# Patient Record
Sex: FEMALE | URBAN - METROPOLITAN AREA
[De-identification: names, ages, dates, MRNs, and addresses within clinical notes are randomized per-mention and may not be internally consistent; named-entity substitution may affect disease eponyms.]

---

## 2017-03-05 ENCOUNTER — EMERGENCY (EMERGENCY)
Facility: HOSPITAL | Age: 62
LOS: 0 days | Discharge: HOME | End: 2017-03-05
Attending: EMERGENCY MEDICINE | Admitting: EMERGENCY MEDICINE
Payer: COMMERCIAL

## 2017-03-05 VITALS
TEMPERATURE: 98 F | OXYGEN SATURATION: 99 % | SYSTOLIC BLOOD PRESSURE: 131 MMHG | RESPIRATION RATE: 16 BRPM | DIASTOLIC BLOOD PRESSURE: 78 MMHG | HEART RATE: 74 BPM

## 2017-03-05 VITALS — HEIGHT: 62 IN | WEIGHT: 199.96 LBS

## 2017-03-05 DIAGNOSIS — S63.502A UNSPECIFIED SPRAIN OF LEFT WRIST, INITIAL ENCOUNTER: ICD-10-CM
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
## 2017-03-05 DIAGNOSIS — M25.532 PAIN IN LEFT WRIST: ICD-10-CM

## 2017-03-05 DIAGNOSIS — E03.9 HYPOTHYROIDISM, UNSPECIFIED: ICD-10-CM
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
## 2017-03-05 DIAGNOSIS — I10 ESSENTIAL (PRIMARY) HYPERTENSION: ICD-10-CM

## 2017-03-05 PROCEDURE — 73090 X-RAY EXAM OF FOREARM: CPT | Mod: 26,LT

## 2017-03-05 PROCEDURE — 73130 X-RAY EXAM OF HAND: CPT | Mod: 26,LT

## 2017-03-05 PROCEDURE — 99283 EMERGENCY DEPT VISIT LOW MDM: CPT

## 2017-03-05 PROCEDURE — 73110 X-RAY EXAM OF WRIST: CPT | Mod: 26,LT

## 2017-03-05 RX ORDER — LEVOTHYROXINE SODIUM 125 MCG
1 TABLET ORAL
Qty: 0 | Refills: 0 | COMMUNITY

## 2017-03-05 RX ORDER — IBUPROFEN 200 MG
600 TABLET ORAL ONCE
Qty: 0 | Refills: 0 | Status: COMPLETED | OUTPATIENT
Start: 2017-03-05 | End: 2017-03-05

## 2017-03-05 RX ADMIN — Medication 600 MILLIGRAM(S): at 11:04

## 2017-03-05 NOTE — ED STATDOCS - DETAILS:
I, Sahra Funk, performed the initial face to face bedside interview with this patient regarding history of present illness, review of symptoms and relevant past medical, social and family history.  I completed an independent physical examination.  I was the initial provider who evaluated this patient. I have signed out the follow up of any pending tests (i.e. labs, radiological studies) to the ACP.  I have communicated the patient’s plan of care and disposition with the ACP.  The history, relevant review of systems, past medical and surgical history, medical decision making, and physical examination was documented by the scribe in my presence and I attest to the accuracy of the documentation.

## 2017-03-05 NOTE — ED PROCEDURE NOTE - NS ED PERI VASCULAR NEG
fingers/toes warm to touch/no cyanosis of extremity/no swelling/no paresthesia/capillary refill time < 2 seconds

## 2017-03-05 NOTE — ED PROCEDURE NOTE - CPROC ED POST PROC CARE GUIDE1
Verbal/written post procedure instructions were given to patient/caregiver./Elevate the injured extremity as instructed./Instructed patient/caregiver to follow-up with primary care physician.

## 2017-03-05 NOTE — ED STATDOCS - OBJECTIVE STATEMENT
60 y/o female pmhx htn, thyroid presents to ED due to fall. Pt states she fell off her stoop, no LOC. C/o of left wrist pain denies numbness, tingling. 60 y/o female pmhx htn, thyroid, no blood thinners, no smoke, allergic to penicillin presents to ED due to fall. Pt states she fell off her stoop, no LOC. C/o of left wrist pain denies numbness, tingling. 62 y/o female pmhx htn, thyroid, no blood thinners, no smoke, allergic to penicillin presents to ED due to fall. Pt states she fell off her stoop yesterday, no LOC. C/o of left wrist pain denies numbness, tingling.

## 2017-03-05 NOTE — ED STATDOCS - MUSCULOSKELETAL FINDINGS, MLM
painful ROM, no point tenderness in left wrist. Medeal, radial and ulna nerves in tact. good radial pulse . No tenderness of forearm. painful ROM, no point tenderness in left wrist. Median, radial and ulnar nerves in tact. good radial pulse . No tenderness of forearm., elbow, shoulder

## 2017-03-05 NOTE — ED ADULT NURSE NOTE - OBJECTIVE STATEMENT
Yesturday fell at 2.30 fell on concrete (-0 loc left wrist inkury (-)m deformity (+) swelling (+) pain left knwee abrasion  healing

## 2017-03-05 NOTE — ED STATDOCS - PROGRESS NOTE DETAILS
Patient seen and evaluated, no point tenderness to L wrist, no snuffbox tenderness, decreased ROM with flexion and extension secondary to pain, 2+ radial pulse, hand with FROM and strength, cap refill less than 2 seconds.  Xray with no fx, foam wrist splint applied, ortho f/u given -Arturo Lackey PA-C

## 2018-03-01 ENCOUNTER — TRANSCRIPTION ENCOUNTER (OUTPATIENT)
Age: 63
End: 2018-03-01

## 2019-06-28 PROBLEM — I10 ESSENTIAL (PRIMARY) HYPERTENSION: Chronic | Status: ACTIVE | Noted: 2017-03-05

## 2019-06-28 PROBLEM — E03.9 HYPOTHYROIDISM, UNSPECIFIED: Chronic | Status: ACTIVE | Noted: 2017-03-05

## 2019-07-25 ENCOUNTER — APPOINTMENT (OUTPATIENT)
Dept: GASTROENTEROLOGY | Facility: CLINIC | Age: 64
End: 2019-07-25
Payer: COMMERCIAL

## 2019-07-25 VITALS
HEIGHT: 62 IN | HEART RATE: 75 BPM | DIASTOLIC BLOOD PRESSURE: 92 MMHG | SYSTOLIC BLOOD PRESSURE: 169 MMHG | WEIGHT: 218 LBS | BODY MASS INDEX: 40.12 KG/M2

## 2019-07-25 PROCEDURE — 99205 OFFICE O/P NEW HI 60 MIN: CPT

## 2019-07-25 NOTE — PHYSICAL EXAM
[General Appearance - Alert] : alert [General Appearance - In No Acute Distress] : in no acute distress [Sclera] : the sclera and conjunctiva were normal [PERRL With Normal Accommodation] : pupils were equal in size, round, and reactive to light [Extraocular Movements] : extraocular movements were intact [Outer Ear] : the ears and nose were normal in appearance [Oropharynx] : the oropharynx was normal [Neck Appearance] : the appearance of the neck was normal [Neck Cervical Mass (___cm)] : no neck mass was observed [Jugular Venous Distention Increased] : there was no jugular-venous distention [Thyroid Diffuse Enlargement] : the thyroid was not enlarged [Thyroid Nodule] : there were no palpable thyroid nodules [Auscultation Breath Sounds / Voice Sounds] : lungs were clear to auscultation bilaterally [Heart Rate And Rhythm] : heart rate was normal and rhythm regular [Heart Sounds] : normal S1 and S2 [Heart Sounds Gallop] : no gallops [Murmurs] : no murmurs [Heart Sounds Pericardial Friction Rub] : no pericardial rub [Bowel Sounds] : normal bowel sounds [Abdomen Soft] : soft [Abdomen Tenderness] : non-tender [Abdomen Mass (___ Cm)] : no abdominal mass palpated [Abnormal Walk] : normal gait [Nail Clubbing] : no clubbing  or cyanosis of the fingernails [Musculoskeletal - Swelling] : no joint swelling seen [Motor Tone] : muscle strength and tone were normal [Skin Color & Pigmentation] : normal skin color and pigmentation [Skin Turgor] : normal skin turgor [] : no rash [Deep Tendon Reflexes (DTR)] : deep tendon reflexes were 2+ and symmetric [Sensation] : the sensory exam was normal to light touch and pinprick [No Focal Deficits] : no focal deficits [Oriented To Time, Place, And Person] : oriented to person, place, and time [Impaired Insight] : insight and judgment were intact [Affect] : the affect was normal

## 2019-07-25 NOTE — HISTORY OF PRESENT ILLNESS
[FreeTextEntry1] : Patient had a physical exam and was found to have occult positive stools she uses Aleve and Advil from time to time. She denies heartburn or dysphagia he recently had chest pain and was evaluated for cardiac etiology which was reportedly unrevealing other than finding any murmur. The character of that murmur she does not recall. A sonogram was also performed and reportedly a gallstone was demonstrated she has not had any subsequent bouts of pain and no jaundice no nausea and no pain radiating to her back. She has had a colonoscopy many years ago with a history of colon polyps

## 2019-07-25 NOTE — ASSESSMENT
[FreeTextEntry1] : #1 Hemoccult-positive stools will schedule a colonoscopy and upper endoscopy she has had a history of use of nonsteroidal anti-inflammatory drugs and will ask her to withhold using it at this time and she's had a history of colon polyps which requires followup at this time anyway. #2 chest pain whether this is a noncardiac chest pain etiology or in some way related to her murmur is unclear gallstones being noted on a sonogram also suggested biliary etiology although it is not certain therefore we'll use a low-fat which will be given towards weight loss as well

## 2019-07-25 NOTE — REASON FOR VISIT
[Consultation] : a consultation visit [FreeTextEntry1] : Occult positive stools noncardiac chest pain obesity

## 2019-07-29 ENCOUNTER — RESULT REVIEW (OUTPATIENT)
Age: 64
End: 2019-07-29

## 2019-07-29 ENCOUNTER — APPOINTMENT (OUTPATIENT)
Dept: GASTROENTEROLOGY | Facility: AMBULATORY MEDICAL SERVICES | Age: 64
End: 2019-07-29
Payer: COMMERCIAL

## 2019-07-29 PROCEDURE — 43239 EGD BIOPSY SINGLE/MULTIPLE: CPT

## 2019-07-29 PROCEDURE — 45378 DIAGNOSTIC COLONOSCOPY: CPT

## 2019-11-14 ENCOUNTER — APPOINTMENT (OUTPATIENT)
Dept: GASTROENTEROLOGY | Facility: CLINIC | Age: 64
End: 2019-11-14
Payer: COMMERCIAL

## 2019-11-14 VITALS
HEIGHT: 62 IN | DIASTOLIC BLOOD PRESSURE: 84 MMHG | HEART RATE: 66 BPM | BODY MASS INDEX: 39.75 KG/M2 | WEIGHT: 216 LBS | SYSTOLIC BLOOD PRESSURE: 143 MMHG

## 2019-11-14 DIAGNOSIS — R19.5 OTHER FECAL ABNORMALITIES: ICD-10-CM

## 2019-11-14 PROCEDURE — 99214 OFFICE O/P EST MOD 30 MIN: CPT

## 2019-11-14 NOTE — PHYSICAL EXAM
[General Appearance - Alert] : alert [General Appearance - In No Acute Distress] : in no acute distress [PERRL With Normal Accommodation] : pupils were equal in size, round, and reactive to light [Sclera] : the sclera and conjunctiva were normal [Extraocular Movements] : extraocular movements were intact [Outer Ear] : the ears and nose were normal in appearance [Oropharynx] : the oropharynx was normal [Neck Appearance] : the appearance of the neck was normal [Neck Cervical Mass (___cm)] : no neck mass was observed [Jugular Venous Distention Increased] : there was no jugular-venous distention [Thyroid Diffuse Enlargement] : the thyroid was not enlarged [Thyroid Nodule] : there were no palpable thyroid nodules [Auscultation Breath Sounds / Voice Sounds] : lungs were clear to auscultation bilaterally [Heart Rate And Rhythm] : heart rate was normal and rhythm regular [Heart Sounds] : normal S1 and S2 [Heart Sounds Gallop] : no gallops [Murmurs] : no murmurs [Heart Sounds Pericardial Friction Rub] : no pericardial rub [Bowel Sounds] : normal bowel sounds [Abdomen Soft] : soft [Abdomen Tenderness] : non-tender [Abdomen Mass (___ Cm)] : no abdominal mass palpated [Abnormal Walk] : normal gait [Musculoskeletal - Swelling] : no joint swelling seen [Nail Clubbing] : no clubbing  or cyanosis of the fingernails [Motor Tone] : muscle strength and tone were normal [Skin Turgor] : normal skin turgor [Skin Color & Pigmentation] : normal skin color and pigmentation [] : no rash [Sensation] : the sensory exam was normal to light touch and pinprick [Deep Tendon Reflexes (DTR)] : deep tendon reflexes were 2+ and symmetric [No Focal Deficits] : no focal deficits [Oriented To Time, Place, And Person] : oriented to person, place, and time [Impaired Insight] : insight and judgment were intact [Affect] : the affect was normal

## 2019-11-14 NOTE — HISTORY OF PRESENT ILLNESS
[FreeTextEntry1] : Should hear today for followup after her upper endoscopy suggested reflux and colonoscopy was reportedly unrevealing a Schatzki ring was seen at the endoscopy but this is asymptomatic she has had chest pains in the past I would appear to be water brash repeating close to bedtime she has been on PPI therapy b.i.d. with marked improvement of her symptoms no chest pain no shortness of breath no heartburn no dysphagia has been no pouch of the topical pain despite reports elsewhere of gallstones she denies any jaundice and she has not had any weight loss despite being recommended to do so

## 2019-11-14 NOTE — ASSESSMENT
[FreeTextEntry1] : #1 gastroesophageal reflux disease with water cody would suggest continuing PPI therapy b.i.d. and advise on a GERD diet strictly and work on weight loss lately bloody decreasing fats in the diet which will help normalize and a biliary colic as well we'll give the patient name of a surgeon if pain clearly recurs and she was advised to go to the emergency room if this were to occur due to the fact that things are not clearly related to her gallstone we will continue to be monitoring for symptoms

## 2020-01-14 ENCOUNTER — LABORATORY RESULT (OUTPATIENT)
Age: 65
End: 2020-01-14

## 2020-01-14 ENCOUNTER — APPOINTMENT (OUTPATIENT)
Dept: GASTROENTEROLOGY | Facility: CLINIC | Age: 65
End: 2020-01-14
Payer: COMMERCIAL

## 2020-01-14 VITALS
BODY MASS INDEX: 40.12 KG/M2 | HEIGHT: 62 IN | WEIGHT: 218 LBS | HEART RATE: 81 BPM | DIASTOLIC BLOOD PRESSURE: 84 MMHG | SYSTOLIC BLOOD PRESSURE: 153 MMHG

## 2020-01-14 PROCEDURE — 99214 OFFICE O/P EST MOD 30 MIN: CPT

## 2020-01-14 NOTE — HISTORY OF PRESENT ILLNESS
[FreeTextEntry1] : Patient recently has a repeat episode of sharp pain radiating to her left shoulder. She recently saw a surgeon who did not feel it was clearly biliary in nature. This pain woke her from her sleep she has had upper endoscopy which showed a large hiatal hernia and advised to take Prilosec twice a day but she was only taking it one time a day her sonogram done suggested gallstones but the report is not with her today. She denies jaundice or fevers she denies nausea or shortness of breath

## 2020-01-14 NOTE — PHYSICAL EXAM
[General Appearance - Alert] : alert [Sclera] : the sclera and conjunctiva were normal [General Appearance - In No Acute Distress] : in no acute distress [Extraocular Movements] : extraocular movements were intact [PERRL With Normal Accommodation] : pupils were equal in size, round, and reactive to light [Oropharynx] : the oropharynx was normal [Neck Appearance] : the appearance of the neck was normal [Outer Ear] : the ears and nose were normal in appearance [Jugular Venous Distention Increased] : there was no jugular-venous distention [Thyroid Nodule] : there were no palpable thyroid nodules [Neck Cervical Mass (___cm)] : no neck mass was observed [Thyroid Diffuse Enlargement] : the thyroid was not enlarged [Auscultation Breath Sounds / Voice Sounds] : lungs were clear to auscultation bilaterally [Heart Rate And Rhythm] : heart rate was normal and rhythm regular [Heart Sounds Gallop] : no gallops [Heart Sounds] : normal S1 and S2 [Heart Sounds Pericardial Friction Rub] : no pericardial rub [Murmurs] : no murmurs [Abdomen Tenderness] : non-tender [Abdomen Soft] : soft [Bowel Sounds] : normal bowel sounds [Abnormal Walk] : normal gait [Abdomen Mass (___ Cm)] : no abdominal mass palpated [Nail Clubbing] : no clubbing  or cyanosis of the fingernails [Musculoskeletal - Swelling] : no joint swelling seen [Motor Tone] : muscle strength and tone were normal [Skin Color & Pigmentation] : normal skin color and pigmentation [Skin Turgor] : normal skin turgor [Deep Tendon Reflexes (DTR)] : deep tendon reflexes were 2+ and symmetric [] : no rash [No Focal Deficits] : no focal deficits [Sensation] : the sensory exam was normal to light touch and pinprick [Oriented To Time, Place, And Person] : oriented to person, place, and time [Impaired Insight] : insight and judgment were intact [Affect] : the affect was normal

## 2020-01-14 NOTE — ASSESSMENT
[FreeTextEntry1] : Pain radiating to her left shoulder likely biliary but need to rule out esophageal spasm we'll perform CAT scan of the abdomen and get blood work views Prilosec b.i.d. and add NuLev

## 2020-01-21 ENCOUNTER — APPOINTMENT (OUTPATIENT)
Dept: CT IMAGING | Facility: CLINIC | Age: 65
End: 2020-01-21
Payer: COMMERCIAL

## 2020-01-21 ENCOUNTER — OUTPATIENT (OUTPATIENT)
Dept: OUTPATIENT SERVICES | Facility: HOSPITAL | Age: 65
LOS: 1 days | End: 2020-01-21
Payer: COMMERCIAL

## 2020-01-21 DIAGNOSIS — Z00.8 ENCOUNTER FOR OTHER GENERAL EXAMINATION: ICD-10-CM

## 2020-01-21 DIAGNOSIS — K21.9 GASTRO-ESOPHAGEAL REFLUX DISEASE WITHOUT ESOPHAGITIS: ICD-10-CM

## 2020-01-21 DIAGNOSIS — K80.20 CALCULUS OF GALLBLADDER WITHOUT CHOLECYSTITIS WITHOUT OBSTRUCTION: ICD-10-CM

## 2020-01-21 PROCEDURE — 74177 CT ABD & PELVIS W/CONTRAST: CPT

## 2020-01-21 PROCEDURE — 74177 CT ABD & PELVIS W/CONTRAST: CPT | Mod: 26

## 2020-01-21 PROCEDURE — 82565 ASSAY OF CREATININE: CPT

## 2020-02-25 ENCOUNTER — APPOINTMENT (OUTPATIENT)
Dept: GASTROENTEROLOGY | Facility: CLINIC | Age: 65
End: 2020-02-25
Payer: COMMERCIAL

## 2020-02-25 VITALS
HEIGHT: 62 IN | SYSTOLIC BLOOD PRESSURE: 124 MMHG | DIASTOLIC BLOOD PRESSURE: 79 MMHG | WEIGHT: 215 LBS | BODY MASS INDEX: 39.56 KG/M2 | HEART RATE: 73 BPM

## 2020-02-25 DIAGNOSIS — K21.9 GASTRO-ESOPHAGEAL REFLUX DISEASE W/OUT ESOPHAGITIS: ICD-10-CM

## 2020-02-25 PROCEDURE — 99214 OFFICE O/P EST MOD 30 MIN: CPT

## 2020-02-25 NOTE — HISTORY OF PRESENT ILLNESS
[FreeTextEntry1] : Patient feeling more comfortable on anti- reflux regimen she had one episode of chest pain near her left shoulder. She denies dysphagia, and a CAT scan demonstrated gallstones without cholecystitis. She denies jaundice and she has been having a great deal of difficulty with weight loss

## 2020-02-25 NOTE — PHYSICAL EXAM
[General Appearance - Alert] : alert [General Appearance - In No Acute Distress] : in no acute distress [Sclera] : the sclera and conjunctiva were normal [Extraocular Movements] : extraocular movements were intact [PERRL With Normal Accommodation] : pupils were equal in size, round, and reactive to light [Outer Ear] : the ears and nose were normal in appearance [Oropharynx] : the oropharynx was normal [Neck Appearance] : the appearance of the neck was normal [Neck Cervical Mass (___cm)] : no neck mass was observed [Jugular Venous Distention Increased] : there was no jugular-venous distention [Thyroid Diffuse Enlargement] : the thyroid was not enlarged [Thyroid Nodule] : there were no palpable thyroid nodules [Auscultation Breath Sounds / Voice Sounds] : lungs were clear to auscultation bilaterally [Heart Rate And Rhythm] : heart rate was normal and rhythm regular [Heart Sounds] : normal S1 and S2 [Heart Sounds Gallop] : no gallops [Murmurs] : no murmurs [Heart Sounds Pericardial Friction Rub] : no pericardial rub [Bowel Sounds] : normal bowel sounds [Abdomen Soft] : soft [Abdomen Tenderness] : non-tender [Abdomen Mass (___ Cm)] : no abdominal mass palpated [Abnormal Walk] : normal gait [Nail Clubbing] : no clubbing  or cyanosis of the fingernails [Musculoskeletal - Swelling] : no joint swelling seen [Motor Tone] : muscle strength and tone were normal [Skin Color & Pigmentation] : normal skin color and pigmentation [Skin Turgor] : normal skin turgor [] : no rash [Sensation] : the sensory exam was normal to light touch and pinprick [Deep Tendon Reflexes (DTR)] : deep tendon reflexes were 2+ and symmetric [No Focal Deficits] : no focal deficits [Oriented To Time, Place, And Person] : oriented to person, place, and time [Impaired Insight] : insight and judgment were intact [Affect] : the affect was normal

## 2020-04-07 ENCOUNTER — APPOINTMENT (OUTPATIENT)
Dept: GASTROENTEROLOGY | Facility: CLINIC | Age: 65
End: 2020-04-07

## 2020-11-18 ENCOUNTER — APPOINTMENT (OUTPATIENT)
Dept: GASTROENTEROLOGY | Facility: CLINIC | Age: 65
End: 2020-11-18
Payer: COMMERCIAL

## 2020-11-18 VITALS
SYSTOLIC BLOOD PRESSURE: 153 MMHG | BODY MASS INDEX: 40.12 KG/M2 | WEIGHT: 218 LBS | DIASTOLIC BLOOD PRESSURE: 79 MMHG | HEART RATE: 66 BPM | TEMPERATURE: 98.3 F | HEIGHT: 62 IN

## 2020-11-18 PROCEDURE — 99213 OFFICE O/P EST LOW 20 MIN: CPT

## 2020-11-18 RX ORDER — HYOSCYAMINE SULFATE 0.38 MG/1
0.38 TABLET, EXTENDED RELEASE ORAL
Qty: 60 | Refills: 3 | Status: DISCONTINUED | COMMUNITY
Start: 2020-11-18 | End: 2020-11-18

## 2020-11-18 NOTE — ASSESSMENT
[FreeTextEntry1] : 66 yo female with left upper chest wall spasm. \par She had this before in past and it was thought to possibly biliary per Dr. Murray? \par Did see Dr. Kohler but did not have a ccy. \par I believe this could be stress induced spasm?\par Will tried antispasmodics, pt to f/u with cardiology. \par Call back if pain gets worse. \par F/u 2 weeks, discussed with Dr. Rey.

## 2020-11-18 NOTE — HISTORY OF PRESENT ILLNESS
[de-identified] : 64 yo female with hx of left upper chest wall spasms.  Pt reports she had another occurrence that occurs intermittently.  She has had it worked up by Dr. Murray and Dr. Jean (cardiology) and was unremarkable. States this occurrence happened after eating pork chops.  Now pain is improved.  Does report increased stress and denies ever trying an antispasmodic in past.  No SOB.  Denies abdominal pain, n/v.

## 2020-12-09 ENCOUNTER — APPOINTMENT (OUTPATIENT)
Dept: GASTROENTEROLOGY | Facility: CLINIC | Age: 65
End: 2020-12-09

## 2021-05-27 ENCOUNTER — APPOINTMENT (OUTPATIENT)
Dept: GASTROENTEROLOGY | Facility: CLINIC | Age: 66
End: 2021-05-27
Payer: COMMERCIAL

## 2021-05-27 VITALS
WEIGHT: 221 LBS | HEART RATE: 67 BPM | BODY MASS INDEX: 40.67 KG/M2 | DIASTOLIC BLOOD PRESSURE: 83 MMHG | HEIGHT: 62 IN | SYSTOLIC BLOOD PRESSURE: 147 MMHG

## 2021-05-27 DIAGNOSIS — K80.20 CALCULUS OF GALLBLADDER W/OUT CHOLECYSTITIS W/OUT OBSTRUCTION: ICD-10-CM

## 2021-05-27 DIAGNOSIS — K22.4 DYSKINESIA OF ESOPHAGUS: ICD-10-CM

## 2021-05-27 DIAGNOSIS — E66.9 OBESITY, UNSPECIFIED: ICD-10-CM

## 2021-05-27 PROCEDURE — 99072 ADDL SUPL MATRL&STAF TM PHE: CPT

## 2021-05-27 PROCEDURE — 99214 OFFICE O/P EST MOD 30 MIN: CPT

## 2021-05-27 NOTE — HISTORY OF PRESENT ILLNESS
[FreeTextEntry1] : Patient was here last year, for esophageal chest pain, and was found to have gallstones, as well as a large hiatal hernia. She was put on PPI therapy and the esophageal chest pain disappeared. She was given a course of Qsymia for obesity but never used. It

## 2021-05-27 NOTE — ASSESSMENT
[FreeTextEntry1] : #1, obesity, we'll restart Qsymia\par #2 gallstones we'll continue to monitor and advise on a low-fat diet\par #3 large hiatal hernia with erosive esophagitis. Repeat endoscopy at this time

## 2021-05-27 NOTE — PHYSICAL EXAM
[General Appearance - Alert] : alert [General Appearance - In No Acute Distress] : in no acute distress [Sclera] : the sclera and conjunctiva were normal [PERRL With Normal Accommodation] : pupils were equal in size, round, and reactive to light [Extraocular Movements] : extraocular movements were intact [Outer Ear] : the ears and nose were normal in appearance [Oropharynx] : the oropharynx was normal [Neck Appearance] : the appearance of the neck was normal [Neck Cervical Mass (___cm)] : no neck mass was observed [Jugular Venous Distention Increased] : there was no jugular-venous distention [Thyroid Diffuse Enlargement] : the thyroid was not enlarged [Thyroid Nodule] : there were no palpable thyroid nodules [Auscultation Breath Sounds / Voice Sounds] : lungs were clear to auscultation bilaterally [Heart Rate And Rhythm] : heart rate was normal and rhythm regular [Heart Sounds] : normal S1 and S2 [Heart Sounds Gallop] : no gallops [Murmurs] : no murmurs [Heart Sounds Pericardial Friction Rub] : no pericardial rub [Bowel Sounds] : normal bowel sounds [Abdomen Soft] : soft [Abdomen Tenderness] : non-tender [Abdomen Mass (___ Cm)] : no abdominal mass palpated [Nail Clubbing] : no clubbing  or cyanosis of the fingernails [Abnormal Walk] : normal gait [Musculoskeletal - Swelling] : no joint swelling seen [Motor Tone] : muscle strength and tone were normal [Skin Color & Pigmentation] : normal skin color and pigmentation [Skin Turgor] : normal skin turgor [] : no rash [Deep Tendon Reflexes (DTR)] : deep tendon reflexes were 2+ and symmetric [Sensation] : the sensory exam was normal to light touch and pinprick [No Focal Deficits] : no focal deficits [Oriented To Time, Place, And Person] : oriented to person, place, and time [Affect] : the affect was normal [Impaired Insight] : insight and judgment were intact

## 2021-07-07 ENCOUNTER — RESULT REVIEW (OUTPATIENT)
Age: 66
End: 2021-07-07

## 2021-07-07 ENCOUNTER — APPOINTMENT (OUTPATIENT)
Dept: GASTROENTEROLOGY | Facility: AMBULATORY MEDICAL SERVICES | Age: 66
End: 2021-07-07
Payer: COMMERCIAL

## 2021-07-07 PROCEDURE — 43239 EGD BIOPSY SINGLE/MULTIPLE: CPT

## 2022-11-19 ENCOUNTER — OFFICE (OUTPATIENT)
Dept: URBAN - METROPOLITAN AREA CLINIC 12 | Facility: CLINIC | Age: 67
Setting detail: OPHTHALMOLOGY
End: 2022-11-19
Payer: COMMERCIAL

## 2022-11-19 DIAGNOSIS — H40.033: ICD-10-CM

## 2022-11-19 DIAGNOSIS — H16.223: ICD-10-CM

## 2022-11-19 DIAGNOSIS — H40.013: ICD-10-CM

## 2022-11-19 DIAGNOSIS — H25.13: ICD-10-CM

## 2022-11-19 PROCEDURE — 92133 CPTRZD OPH DX IMG PST SGM ON: CPT | Performed by: OPTOMETRIST

## 2022-11-19 PROCEDURE — 76514 ECHO EXAM OF EYE THICKNESS: CPT | Performed by: OPTOMETRIST

## 2022-11-19 PROCEDURE — 92014 COMPRE OPH EXAM EST PT 1/>: CPT | Performed by: OPTOMETRIST

## 2022-11-19 ASSESSMENT — REFRACTION_MANIFEST
OS_CYLINDER: -1.75
OD_ADD: +2.00
OD_VA1: 20/20-3
OS_ADD: +2.00
OD_CYLINDER: -1.25
OD_AXIS: 015
OS_VA1: 20/20-1
OS_AXIS: 015
OD_SPHERE: +5.25
OS_SPHERE: +6.25

## 2022-11-19 ASSESSMENT — REFRACTION_AUTOREFRACTION
OD_AXIS: 024
OS_SPHERE: +6.00
OD_SPHERE: +5.25
OD_CYLINDER: -0.75
OS_CYLINDER: -1.50
OS_AXIS: 018

## 2022-11-19 ASSESSMENT — REFRACTION_CURRENTRX
OD_CYLINDER: -2.00
OD_AXIS: 023
OS_ADD: +1.25
OD_CYLINDER: -1.00
OD_VPRISM_DIRECTION: PROGS
OD_AXIS: 030
OS_VPRISM_DIRECTION: PROGS
OD_SPHERE: +6.75
OD_ADD: +1.25
OS_CYLINDER: -1.50
OS_SPHERE: +6.75
OS_OVR_VA: 20/
OS_AXIS: 024
OS_ADD: +1.25
OS_SPHERE: +6.25
OS_VPRISM_DIRECTION: PROGS
OS_CYLINDER: -2.00
OD_ADD: +1.25
OS_OVR_VA: 20/
OD_VPRISM_DIRECTION: PROGS
OS_AXIS: 024
OD_OVR_VA: 20/
OD_OVR_VA: 20/
OD_SPHERE: +6.25

## 2022-11-19 ASSESSMENT — AXIALLENGTH_DERIVED
OD_AL: 21.5286
OS_AL: 21.2135
OD_AL: 21.4478
OS_AL: 21.2529

## 2022-11-19 ASSESSMENT — PACHYMETRY
OS_CT_CORRECTION: 1
OS_CT_UM: 521
OD_CT_UM: 532
OD_CT_CORRECTION: 1

## 2022-11-19 ASSESSMENT — KERATOMETRY
METHOD_AUTO_MANUAL: AUTO
OD_K1POWER_DIOPTERS: 44.00
OS_K2POWER_DIOPTERS: 46.00
OD_K2POWER_DIOPTERS: 45.50
OS_K1POWER_DIOPTERS: 44.00
OS_AXISANGLE_DEGREES: 102
OD_AXISANGLE_DEGREES: 104

## 2022-11-19 ASSESSMENT — TONOMETRY
OS_IOP_MMHG: 19
OD_IOP_MMHG: 21

## 2022-11-19 ASSESSMENT — SUPERFICIAL PUNCTATE KERATITIS (SPK)
OD_SPK: 1+
OS_SPK: 1+

## 2022-11-19 ASSESSMENT — SPHEQUIV_DERIVED
OS_SPHEQUIV: 5.375
OS_SPHEQUIV: 5.25
OD_SPHEQUIV: 4.625
OD_SPHEQUIV: 4.875

## 2022-11-19 ASSESSMENT — CONFRONTATIONAL VISUAL FIELD TEST (CVF)
OS_FINDINGS: FULL
OD_FINDINGS: FULL

## 2022-11-19 ASSESSMENT — VISUAL ACUITY
OS_BCVA: 20/40-1
OD_BCVA: 20/30-1

## 2023-02-16 DIAGNOSIS — N95.2 POSTMENOPAUSAL ATROPHIC VAGINITIS: ICD-10-CM

## 2023-02-18 ENCOUNTER — OFFICE (OUTPATIENT)
Dept: URBAN - METROPOLITAN AREA CLINIC 12 | Facility: CLINIC | Age: 68
Setting detail: OPHTHALMOLOGY
End: 2023-02-18
Payer: COMMERCIAL

## 2023-02-18 DIAGNOSIS — H25.13: ICD-10-CM

## 2023-02-18 DIAGNOSIS — H40.013: ICD-10-CM

## 2023-02-18 DIAGNOSIS — H16.223: ICD-10-CM

## 2023-02-18 DIAGNOSIS — H40.033: ICD-10-CM

## 2023-02-18 PROCEDURE — 99213 OFFICE O/P EST LOW 20 MIN: CPT | Performed by: OPTOMETRIST

## 2023-02-18 PROCEDURE — 92020 GONIOSCOPY: CPT | Performed by: OPTOMETRIST

## 2023-02-18 PROCEDURE — 92083 EXTENDED VISUAL FIELD XM: CPT | Performed by: OPTOMETRIST

## 2023-02-18 ASSESSMENT — REFRACTION_MANIFEST
OD_SPHERE: +5.25
OS_CYLINDER: -1.75
OD_VA1: 20/20-3
OS_ADD: +2.00
OD_AXIS: 015
OD_ADD: +2.00
OS_VA1: 20/20-1
OS_SPHERE: +6.25
OS_AXIS: 015
OD_CYLINDER: -1.25

## 2023-02-18 ASSESSMENT — REFRACTION_CURRENTRX
OS_ADD: +2.75
OD_VPRISM_DIRECTION: PROGS
OS_VPRISM_DIRECTION: PROGS
OS_OVR_VA: 20/
OS_CYLINDER: -2.00
OS_ADD: +1.25
OD_CYLINDER: -0.75
OD_ADD: +1.25
OD_OVR_VA: 20/
OS_SPHERE: +6.75
OS_CYLINDER: -1.50
OD_SPHERE: +6.00
OS_OVR_VA: 20/
OS_AXIS: 024
OS_SPHERE: +6.50
OD_AXIS: 022
OS_AXIS: 019
OD_SPHERE: +6.75
OD_ADD: +2.75
OD_VPRISM_DIRECTION: PROGS
OD_OVR_VA: 20/
OS_VPRISM_DIRECTION: PROGS
OD_CYLINDER: -2.00
OD_AXIS: 030

## 2023-02-18 ASSESSMENT — REFRACTION_AUTOREFRACTION
OD_SPHERE: +5.25
OS_AXIS: 007
OS_CYLINDER: -1.25
OD_AXIS: 002
OD_CYLINDER: -0.50
OS_SPHERE: +6.00

## 2023-02-18 ASSESSMENT — KERATOMETRY
OD_K2POWER_DIOPTERS: 45.50
OD_AXISANGLE_DEGREES: 092
OD_K1POWER_DIOPTERS: 43.75
OS_K2POWER_DIOPTERS: 45.00
OS_K1POWER_DIOPTERS: 43.75
METHOD_AUTO_MANUAL: AUTO
OS_AXISANGLE_DEGREES: 176

## 2023-02-18 ASSESSMENT — PACHYMETRY
OD_CT_UM: 532
OS_CT_UM: 521
OD_CT_CORRECTION: 1
OS_CT_CORRECTION: 1

## 2023-02-18 ASSESSMENT — AXIALLENGTH_DERIVED
OD_AL: 21.4455
OD_AL: 21.5669
OS_AL: 21.4007
OS_AL: 21.4007

## 2023-02-18 ASSESSMENT — CONFRONTATIONAL VISUAL FIELD TEST (CVF)
OS_FINDINGS: FULL
OD_FINDINGS: FULL

## 2023-02-18 ASSESSMENT — SPHEQUIV_DERIVED
OD_SPHEQUIV: 4.625
OS_SPHEQUIV: 5.375
OD_SPHEQUIV: 5
OS_SPHEQUIV: 5.375

## 2023-02-18 ASSESSMENT — SUPERFICIAL PUNCTATE KERATITIS (SPK)
OS_SPK: 1+
OD_SPK: 1+

## 2023-02-18 ASSESSMENT — VISUAL ACUITY
OD_BCVA: 20/30-
OS_BCVA: 20/30-

## 2023-02-18 ASSESSMENT — TONOMETRY
OD_IOP_MMHG: 15
OS_IOP_MMHG: 16

## 2023-02-21 ENCOUNTER — APPOINTMENT (OUTPATIENT)
Dept: OBGYN | Facility: CLINIC | Age: 68
End: 2023-02-21
Payer: COMMERCIAL

## 2023-02-21 ENCOUNTER — RESULT CHARGE (OUTPATIENT)
Age: 68
End: 2023-02-21

## 2023-02-21 VITALS
BODY MASS INDEX: 39.87 KG/M2 | DIASTOLIC BLOOD PRESSURE: 77 MMHG | WEIGHT: 218 LBS | SYSTOLIC BLOOD PRESSURE: 138 MMHG | HEART RATE: 56 BPM

## 2023-02-21 DIAGNOSIS — R92.2 INCONCLUSIVE MAMMOGRAM: ICD-10-CM

## 2023-02-21 LAB
BILIRUB UR QL STRIP: NORMAL
CLARITY UR: CLEAR
COLLECTION METHOD: NORMAL
GLUCOSE UR-MCNC: NORMAL
HCG UR QL: 0.2 EU/DL
HEMOGLOBIN: 11.4
HGB UR QL STRIP.AUTO: NORMAL
KETONES UR-MCNC: NORMAL
LEUKOCYTE ESTERASE UR QL STRIP: NORMAL
NITRITE UR QL STRIP: NORMAL
PH UR STRIP: 5.5
PROT UR STRIP-MCNC: NORMAL
SP GR UR STRIP: 1.03

## 2023-02-21 PROCEDURE — 85018 HEMOGLOBIN: CPT | Mod: QW

## 2023-02-21 PROCEDURE — 99397 PER PM REEVAL EST PAT 65+ YR: CPT | Mod: 25

## 2023-02-21 PROCEDURE — 82270 OCCULT BLOOD FECES: CPT

## 2023-02-21 PROCEDURE — 81003 URINALYSIS AUTO W/O SCOPE: CPT | Mod: QW

## 2023-02-21 NOTE — PHYSICAL EXAM
[Appropriately responsive] : appropriately responsive [Alert] : alert [No Acute Distress] : no acute distress [Clear to Auscultation B/L] : clear to auscultation bilaterally [Soft] : soft [Non-tender] : non-tender [Non-distended] : non-distended [No HSM] : No HSM [No Lesions] : no lesions [No Mass] : no mass [Oriented x3] : oriented x3 [Examination Of The Breasts] : a normal appearance [No Masses] : no breast masses were palpable [Labia Majora] : normal [Labia Minora] : normal [Normal] : normal [Uterine Adnexae] : normal [Normal rectal exam] : was normal [Occult Blood Positive] : was negative for occult blood analysis

## 2023-02-21 NOTE — PLAN
[FreeTextEntry1] : \par \par Patient to follow up in 1 year for annual GYN exam\par Mammogram and bilateral breast US due:     1/24 rx given \par Colonoscopy due:  7/24 Amanda \par Bone density due:  2/24 \par \par Pap ordered\par Hemoccult ordered \par All questions answered, patient agreeable with plan.\par

## 2023-02-21 NOTE — HISTORY OF PRESENT ILLNESS
[TextBox_4] : Patient is a 66yo female here today for annual visit. She denies any GYN complaints at this time\par She recently had pelvic sono which was WNL. endometrium 4mm and ovaries and uterus WNL. Denies any post menopausal bleeding.

## 2023-02-25 LAB — CYTOLOGY CVX/VAG DOC THIN PREP: ABNORMAL

## 2023-08-18 ENCOUNTER — OFFICE (OUTPATIENT)
Dept: URBAN - METROPOLITAN AREA CLINIC 12 | Facility: CLINIC | Age: 68
Setting detail: OPHTHALMOLOGY
End: 2023-08-18
Payer: COMMERCIAL

## 2023-08-18 DIAGNOSIS — H16.223: ICD-10-CM

## 2023-08-18 DIAGNOSIS — H52.4: ICD-10-CM

## 2023-08-18 DIAGNOSIS — H40.033: ICD-10-CM

## 2023-08-18 DIAGNOSIS — H40.013: ICD-10-CM

## 2023-08-18 DIAGNOSIS — H25.13: ICD-10-CM

## 2023-08-18 PROBLEM — H52.7 REFRACTIVE ERROR: Status: ACTIVE | Noted: 2023-08-18

## 2023-08-18 PROCEDURE — 99213 OFFICE O/P EST LOW 20 MIN: CPT | Performed by: OPTOMETRIST

## 2023-08-18 PROCEDURE — 92015 DETERMINE REFRACTIVE STATE: CPT | Performed by: OPTOMETRIST

## 2023-08-18 PROCEDURE — 92133 CPTRZD OPH DX IMG PST SGM ON: CPT | Performed by: OPTOMETRIST

## 2023-08-18 ASSESSMENT — PACHYMETRY
OD_CT_UM: 532
OS_CT_UM: 521
OS_CT_CORRECTION: 1
OD_CT_CORRECTION: 1

## 2023-08-18 ASSESSMENT — REFRACTION_CURRENTRX
OD_CYLINDER: -1.00
OS_OVR_VA: 20/
OD_CYLINDER: -2.00
OS_CYLINDER: -2.00
OS_SPHERE: +6.75
OS_AXIS: 024
OS_ADD: +2.00
OS_OVR_VA: 20/
OD_AXIS: 030
OS_SPHERE: +6.50
OS_CYLINDER: -1.50
OD_ADD: +2.00
OS_VPRISM_DIRECTION: PROGS
OD_AXIS: 015
OS_VPRISM_DIRECTION: PROGS
OD_ADD: +1.25
OS_AXIS: 021
OD_SPHERE: +6.25
OD_VPRISM_DIRECTION: PROGS
OS_ADD: +1.25
OD_OVR_VA: 20/
OD_SPHERE: +6.75
OD_OVR_VA: 20/
OD_VPRISM_DIRECTION: PROGS

## 2023-08-18 ASSESSMENT — TONOMETRY
OS_IOP_MMHG: 17
OD_IOP_MMHG: 17

## 2023-08-18 ASSESSMENT — KERATOMETRY
OS_K1POWER_DIOPTERS: 43.75
METHOD_AUTO_MANUAL: AUTO
OD_K2POWER_DIOPTERS: 46.00
OS_AXISANGLE_DEGREES: 101
OD_AXISANGLE_DEGREES: 096
OD_K1POWER_DIOPTERS: 44.00
OS_K2POWER_DIOPTERS: 46.00

## 2023-08-18 ASSESSMENT — REFRACTION_MANIFEST
OS_VA1: 20/20
OD_AXIS: 015
OD_ADD: +2.50
OD_VA1: 20/20
OS_SPHERE: +6.00
OD_SPHERE: +5.50
OS_CYLINDER: -1.50
OD_CYLINDER: -1.25
OS_AXIS: 015
OS_ADD: +2.50

## 2023-08-18 ASSESSMENT — VISUAL ACUITY
OS_BCVA: 20/40-1
OD_BCVA: 20/40+1

## 2023-08-18 ASSESSMENT — AXIALLENGTH_DERIVED
OS_AL: 21.2903
OS_AL: 21.3699
OD_AL: 21.3722
OD_AL: 21.5332

## 2023-08-18 ASSESSMENT — REFRACTION_AUTOREFRACTION
OD_CYLINDER: -1.25
OD_AXIS: 014
OD_SPHERE: +5.00
OS_SPHERE: +5.75
OS_CYLINDER: -1.50
OS_AXIS: 017

## 2023-08-18 ASSESSMENT — SUPERFICIAL PUNCTATE KERATITIS (SPK)
OD_SPK: 1+
OS_SPK: 1+

## 2023-08-18 ASSESSMENT — SPHEQUIV_DERIVED
OS_SPHEQUIV: 5
OD_SPHEQUIV: 4.375
OS_SPHEQUIV: 5.25
OD_SPHEQUIV: 4.875

## 2023-08-18 ASSESSMENT — CONFRONTATIONAL VISUAL FIELD TEST (CVF)
OD_FINDINGS: FULL
OS_FINDINGS: FULL

## 2023-09-23 ENCOUNTER — OFFICE (OUTPATIENT)
Dept: URBAN - METROPOLITAN AREA CLINIC 12 | Facility: CLINIC | Age: 68
Setting detail: OPHTHALMOLOGY
End: 2023-09-23
Payer: COMMERCIAL

## 2023-09-23 DIAGNOSIS — H40.013: ICD-10-CM

## 2023-09-23 DIAGNOSIS — H25.13: ICD-10-CM

## 2023-09-23 DIAGNOSIS — H40.033: ICD-10-CM

## 2023-09-23 DIAGNOSIS — H16.223: ICD-10-CM

## 2023-09-23 PROCEDURE — 92020 GONIOSCOPY: CPT | Performed by: STUDENT IN AN ORGANIZED HEALTH CARE EDUCATION/TRAINING PROGRAM

## 2023-09-23 PROCEDURE — 99213 OFFICE O/P EST LOW 20 MIN: CPT | Performed by: STUDENT IN AN ORGANIZED HEALTH CARE EDUCATION/TRAINING PROGRAM

## 2023-09-23 ASSESSMENT — KERATOMETRY
OD_K2POWER_DIOPTERS: 46.25
OD_AXISANGLE_DEGREES: 086
OS_AXISANGLE_DEGREES: 103
OS_K2POWER_DIOPTERS: 45.50
OD_K1POWER_DIOPTERS: 44.25
OS_K1POWER_DIOPTERS: 43.75
METHOD_AUTO_MANUAL: AUTO

## 2023-09-23 ASSESSMENT — PACHYMETRY
OS_CT_UM: 521
OD_CT_UM: 532
OS_CT_CORRECTION: 1
OD_CT_CORRECTION: 1

## 2023-09-23 ASSESSMENT — REFRACTION_CURRENTRX
OD_AXIS: 015
OD_OVR_VA: 20/
OS_SPHERE: +6.75
OS_AXIS: 021
OD_OVR_VA: 20/
OS_VPRISM_DIRECTION: PROGS
OS_VPRISM_DIRECTION: PROGS
OD_CYLINDER: -2.00
OS_CYLINDER: -1.50
OD_SPHERE: +6.75
OS_ADD: +1.25
OD_CYLINDER: -1.00
OD_AXIS: 030
OS_SPHERE: +6.50
OD_VPRISM_DIRECTION: PROGS
OS_OVR_VA: 20/
OD_ADD: +1.25
OS_CYLINDER: -2.00
OD_SPHERE: +6.25
OS_OVR_VA: 20/
OD_ADD: +2.00
OS_AXIS: 024
OD_VPRISM_DIRECTION: PROGS
OS_ADD: +2.00

## 2023-09-23 ASSESSMENT — REFRACTION_MANIFEST
OD_AXIS: 015
OS_CYLINDER: -1.50
OD_SPHERE: +5.50
OD_ADD: +2.50
OD_CYLINDER: -1.25
OS_ADD: +2.50
OS_VA1: 20/20
OS_SPHERE: +6.00
OD_VA1: 20/20
OS_AXIS: 015

## 2023-09-23 ASSESSMENT — REFRACTION_AUTOREFRACTION
OD_CYLINDER: -1.00
OS_SPHERE: +6.00
OD_SPHERE: +4.75
OD_AXIS: 006
OS_AXIS: 020
OS_CYLINDER: -1.25

## 2023-09-23 ASSESSMENT — CONFRONTATIONAL VISUAL FIELD TEST (CVF)
OS_FINDINGS: FULL
OD_FINDINGS: FULL

## 2023-09-23 ASSESSMENT — VISUAL ACUITY
OD_BCVA: 20/30+1
OS_BCVA: 20/30

## 2023-09-23 ASSESSMENT — AXIALLENGTH_DERIVED
OD_AL: 21.2971
OS_AL: 21.3254
OD_AL: 21.4974
OS_AL: 21.3653

## 2023-09-23 ASSESSMENT — SUPERFICIAL PUNCTATE KERATITIS (SPK)
OD_SPK: 1+
OS_SPK: 1+

## 2023-09-23 ASSESSMENT — SPHEQUIV_DERIVED
OD_SPHEQUIV: 4.875
OS_SPHEQUIV: 5.375
OD_SPHEQUIV: 4.25
OS_SPHEQUIV: 5.25

## 2023-09-23 ASSESSMENT — TONOMETRY
OS_IOP_MMHG: 20
OD_IOP_MMHG: 19

## 2023-11-11 ENCOUNTER — OFFICE (OUTPATIENT)
Dept: URBAN - METROPOLITAN AREA CLINIC 12 | Facility: CLINIC | Age: 68
Setting detail: OPHTHALMOLOGY
End: 2023-11-11
Payer: COMMERCIAL

## 2023-11-11 DIAGNOSIS — H40.033: ICD-10-CM

## 2023-11-11 DIAGNOSIS — H25.13: ICD-10-CM

## 2023-11-11 PROCEDURE — 99213 OFFICE O/P EST LOW 20 MIN: CPT | Performed by: STUDENT IN AN ORGANIZED HEALTH CARE EDUCATION/TRAINING PROGRAM

## 2023-11-11 PROCEDURE — 76514 ECHO EXAM OF EYE THICKNESS: CPT | Performed by: STUDENT IN AN ORGANIZED HEALTH CARE EDUCATION/TRAINING PROGRAM

## 2023-11-11 ASSESSMENT — REFRACTION_MANIFEST
OD_AXIS: 015
OD_SPHERE: +5.50
OS_VA1: 20/20
OS_CYLINDER: -1.50
OS_AXIS: 015
OS_ADD: +2.50
OD_ADD: +2.50
OS_SPHERE: +6.00
OD_VA1: 20/20
OD_CYLINDER: -1.25

## 2023-11-11 ASSESSMENT — REFRACTION_CURRENTRX
OD_CYLINDER: -2.00
OS_AXIS: 024
OD_AXIS: 017
OS_VPRISM_DIRECTION: PROGS
OD_AXIS: 030
OS_VPRISM_DIRECTION: PROGS
OS_ADD: +2.50
OS_SPHERE: +5.75
OS_CYLINDER: -1.50
OD_VPRISM_DIRECTION: PROGS
OS_AXIS: 015
OS_ADD: +1.25
OD_SPHERE: +5.50
OD_CYLINDER: -1.50
OD_ADD: +1.25
OS_OVR_VA: 20/
OS_OVR_VA: 20/
OS_CYLINDER: -2.00
OD_VPRISM_DIRECTION: PROGS
OD_ADD: +2.50
OD_SPHERE: +6.75
OD_OVR_VA: 20/
OD_OVR_VA: 20/
OS_SPHERE: +6.75

## 2023-11-11 ASSESSMENT — CONFRONTATIONAL VISUAL FIELD TEST (CVF)
OS_FINDINGS: FULL
OD_FINDINGS: FULL

## 2023-11-11 ASSESSMENT — REFRACTION_AUTOREFRACTION
OD_SPHERE: +5.25
OS_SPHERE: +6.00
OS_AXIS: 005
OS_CYLINDER: -2.00
OD_AXIS: 005
OD_CYLINDER: -1.00

## 2023-11-11 ASSESSMENT — SPHEQUIV_DERIVED
OS_SPHEQUIV: 5
OD_SPHEQUIV: 4.75
OD_SPHEQUIV: 4.875
OS_SPHEQUIV: 5.25

## 2023-11-11 ASSESSMENT — SUPERFICIAL PUNCTATE KERATITIS (SPK)
OS_SPK: 1+
OD_SPK: 1+

## 2023-11-16 ENCOUNTER — APPOINTMENT (OUTPATIENT)
Dept: CARDIOLOGY | Facility: CLINIC | Age: 68
End: 2023-11-16

## 2024-01-02 ENCOUNTER — NON-APPOINTMENT (OUTPATIENT)
Age: 69
End: 2024-01-02

## 2024-01-18 ENCOUNTER — NON-APPOINTMENT (OUTPATIENT)
Age: 69
End: 2024-01-18

## 2024-01-27 ENCOUNTER — NON-APPOINTMENT (OUTPATIENT)
Age: 69
End: 2024-01-27

## 2024-02-15 PROBLEM — Z12.11 COLON CANCER SCREENING: Status: ACTIVE | Noted: 2024-02-15

## 2024-02-15 PROBLEM — Z12.4 CERVICAL CANCER SCREENING: Status: ACTIVE | Noted: 2024-02-15

## 2024-02-22 ENCOUNTER — LABORATORY RESULT (OUTPATIENT)
Age: 69
End: 2024-02-22

## 2024-02-22 ENCOUNTER — APPOINTMENT (OUTPATIENT)
Dept: OBGYN | Facility: CLINIC | Age: 69
End: 2024-02-22
Payer: MEDICARE

## 2024-02-22 VITALS
HEIGHT: 62 IN | HEART RATE: 64 BPM | SYSTOLIC BLOOD PRESSURE: 122 MMHG | BODY MASS INDEX: 39.38 KG/M2 | WEIGHT: 214 LBS | DIASTOLIC BLOOD PRESSURE: 75 MMHG

## 2024-02-22 DIAGNOSIS — Z78.0 ASYMPTOMATIC MENOPAUSAL STATE: ICD-10-CM

## 2024-02-22 DIAGNOSIS — Z00.00 ENCOUNTER FOR GENERAL ADULT MEDICAL EXAMINATION W/OUT ABNORMAL FINDINGS: ICD-10-CM

## 2024-02-22 DIAGNOSIS — Z12.31 ENCOUNTER FOR SCREENING MAMMOGRAM FOR MALIGNANT NEOPLASM OF BREAST: ICD-10-CM

## 2024-02-22 DIAGNOSIS — Z01.419 ENCOUNTER FOR GYNECOLOGICAL EXAMINATION (GENERAL) (ROUTINE) W/OUT ABNORMAL FINDINGS: ICD-10-CM

## 2024-02-22 DIAGNOSIS — Z12.4 ENCOUNTER FOR SCREENING FOR MALIGNANT NEOPLASM OF CERVIX: ICD-10-CM

## 2024-02-22 DIAGNOSIS — R10.2 PELVIC AND PERINEAL PAIN: ICD-10-CM

## 2024-02-22 DIAGNOSIS — Z12.11 ENCOUNTER FOR SCREENING FOR MALIGNANT NEOPLASM OF COLON: ICD-10-CM

## 2024-02-22 LAB
BILIRUB UR QL STRIP: NEGATIVE
CLARITY UR: CLEAR
COLLECTION METHOD: NORMAL
DATE COLLECTED: NORMAL
GLUCOSE UR-MCNC: NEGATIVE
HCG UR QL: 0 EU/DL
HEMOCCULT SP1 STL QL: NEGATIVE
HEMOGLOBIN: 11.4
HGB UR QL STRIP.AUTO: NORMAL
KETONES UR-MCNC: NEGATIVE
LEUKOCYTE ESTERASE UR QL STRIP: NEGATIVE
NITRITE UR QL STRIP: NEGATIVE
PH UR STRIP: 6
PROT UR STRIP-MCNC: NEGATIVE
QUALITY CONTROL: YES
SP GR UR STRIP: 1

## 2024-02-22 PROCEDURE — G0101: CPT

## 2024-02-22 PROCEDURE — 81003 URINALYSIS AUTO W/O SCOPE: CPT | Mod: QW

## 2024-02-22 PROCEDURE — 85018 HEMOGLOBIN: CPT | Mod: QW

## 2024-02-22 PROCEDURE — 82270 OCCULT BLOOD FECES: CPT

## 2024-02-22 NOTE — HISTORY OF PRESENT ILLNESS
[TextBox_4] : Patient is a 69yo female here today for annual visit. she complains of pelvic pain/abdominal pain. denies any n/v change in bowel habits  hx hypothyroid

## 2024-02-22 NOTE — PLAN
[FreeTextEntry1] : Patient to follow up in 1 year for annual GYN exam Mammogram and bilateral breast US due: 1/25 rx given  Colonoscopy due: 7/24 per Amanda  Bone density due: now rx given  Will do pelvic sonogram to evaluate uterus and ovaries, if normal she is to follow up with GI all of her questions were answered she is agreeable with plan  will also send UA/UC to evaluate pelvic pain  Pap ordered Hemoccult ordered  All questions answered, patient agreeable with plan.

## 2024-02-23 ENCOUNTER — ASOB RESULT (OUTPATIENT)
Age: 69
End: 2024-02-23

## 2024-02-23 ENCOUNTER — APPOINTMENT (OUTPATIENT)
Dept: OBGYN | Facility: CLINIC | Age: 69
End: 2024-02-23
Payer: MEDICARE

## 2024-02-23 ENCOUNTER — APPOINTMENT (OUTPATIENT)
Dept: ANTEPARTUM | Facility: CLINIC | Age: 69
End: 2024-02-23
Payer: MEDICARE

## 2024-02-23 LAB
APPEARANCE: CLEAR
BILIRUBIN URINE: NEGATIVE
BLOOD URINE: NEGATIVE
COLOR: YELLOW
GLUCOSE QUALITATIVE U: NEGATIVE MG/DL
KETONES URINE: NEGATIVE MG/DL
LEUKOCYTE ESTERASE URINE: ABNORMAL
NITRITE URINE: NEGATIVE
PH URINE: 6
PROTEIN URINE: NEGATIVE MG/DL
SPECIFIC GRAVITY URINE: 1.02
UROBILINOGEN URINE: 0.2 MG/DL

## 2024-02-23 PROCEDURE — 99212 OFFICE O/P EST SF 10 MIN: CPT

## 2024-02-23 PROCEDURE — 76856 US EXAM PELVIC COMPLETE: CPT | Mod: 59

## 2024-02-23 PROCEDURE — 76830 TRANSVAGINAL US NON-OB: CPT

## 2024-02-23 NOTE — HISTORY OF PRESENT ILLNESS
[FreeTextEntry1] : Patient is a 67yo female here today for follow up on pelvic pain. TVUS today revealed multiple myomas, and EML 3mm.  Pt reports discomfort upper abdomen and has not been eating well recently. Overdue for colonoscopy. denies PMB, fever/vomiting/nausea

## 2024-02-23 NOTE — PLAN
[FreeTextEntry1] : discomfort Unlikely r/t GYN etiology  Pt to f/u with GI for colonoscopy Advised if pain persists can schedule consult with JW to discuss treatment options Urine culture pending, will f/u once back AQA Pt verbalized understanding

## 2024-02-26 ENCOUNTER — NON-APPOINTMENT (OUTPATIENT)
Age: 69
End: 2024-02-26

## 2024-02-26 DIAGNOSIS — N30.90 CYSTITIS, UNSPECIFIED W/OUT HEMATURIA: ICD-10-CM

## 2024-02-26 LAB — BACTERIA UR CULT: ABNORMAL

## 2024-02-27 ENCOUNTER — NON-APPOINTMENT (OUTPATIENT)
Age: 69
End: 2024-02-27

## 2024-02-28 LAB — CYTOLOGY CVX/VAG DOC THIN PREP: NORMAL

## 2024-04-27 ENCOUNTER — NON-APPOINTMENT (OUTPATIENT)
Age: 69
End: 2024-04-27

## 2024-06-17 ENCOUNTER — NON-APPOINTMENT (OUTPATIENT)
Age: 69
End: 2024-06-17

## 2024-06-17 ENCOUNTER — APPOINTMENT (OUTPATIENT)
Dept: CARDIOLOGY | Facility: CLINIC | Age: 69
End: 2024-06-17
Payer: MEDICARE

## 2024-06-17 VITALS
SYSTOLIC BLOOD PRESSURE: 140 MMHG | HEART RATE: 59 BPM | DIASTOLIC BLOOD PRESSURE: 76 MMHG | BODY MASS INDEX: 39.2 KG/M2 | HEIGHT: 62 IN | WEIGHT: 213 LBS | OXYGEN SATURATION: 100 %

## 2024-06-17 DIAGNOSIS — E78.5 HYPERLIPIDEMIA, UNSPECIFIED: ICD-10-CM

## 2024-06-17 DIAGNOSIS — R07.9 CHEST PAIN, UNSPECIFIED: ICD-10-CM

## 2024-06-17 PROCEDURE — G2211 COMPLEX E/M VISIT ADD ON: CPT

## 2024-06-17 PROCEDURE — 93000 ELECTROCARDIOGRAM COMPLETE: CPT

## 2024-06-17 PROCEDURE — 99204 OFFICE O/P NEW MOD 45 MIN: CPT

## 2024-06-17 RX ORDER — PRAVASTATIN SODIUM 80 MG/1
TABLET ORAL
Refills: 0 | Status: DISCONTINUED | COMMUNITY
End: 2024-06-17

## 2024-06-17 RX ORDER — HYOSCYAMINE SULFATE 0.12 MG/1
0.12 TABLET, CHEWABLE ORAL
Qty: 15 | Refills: 1 | Status: DISCONTINUED | COMMUNITY
Start: 2020-01-14 | End: 2024-06-17

## 2024-06-17 RX ORDER — PHENTERMINE AND TOPIRAMATE 7.5; 46 MG/1; MG/1
7.5-46 CAPSULE, EXTENDED RELEASE ORAL
Qty: 30 | Refills: 1 | Status: DISCONTINUED | COMMUNITY
Start: 2020-02-25 | End: 2024-06-17

## 2024-06-17 RX ORDER — NITROFURANTOIN (MONOHYDRATE/MACROCRYSTALS) 25; 75 MG/1; MG/1
100 CAPSULE ORAL
Qty: 14 | Refills: 0 | Status: DISCONTINUED | COMMUNITY
Start: 2024-02-26 | End: 2024-06-17

## 2024-06-17 RX ORDER — LEVOTHYROXINE SODIUM 112 UG/1
112 TABLET ORAL
Refills: 0 | Status: DISCONTINUED | COMMUNITY
End: 2024-06-17

## 2024-06-17 RX ORDER — CARVEDILOL 25 MG/1
25 TABLET, FILM COATED ORAL TWICE DAILY
Refills: 0 | Status: ACTIVE | COMMUNITY

## 2024-06-17 RX ORDER — SODIUM SULFATE, POTASSIUM SULFATE, MAGNESIUM SULFATE 17.5; 3.13; 1.6 G/ML; G/ML; G/ML
17.5-3.13-1.6 SOLUTION, CONCENTRATE ORAL
Qty: 1 | Refills: 0 | Status: DISCONTINUED | COMMUNITY
Start: 2019-07-25 | End: 2024-06-17

## 2024-06-17 RX ORDER — CHLORDIAZEPOXIDE HYDROCHLORIDE AND CLIDINIUM BROMIDE 5; 2.5 MG/1; MG/1
5-2.5 CAPSULE, GELATIN COATED ORAL TWICE DAILY
Qty: 60 | Refills: 2 | Status: DISCONTINUED | COMMUNITY
Start: 2020-11-18 | End: 2024-06-17

## 2024-06-17 RX ORDER — PRAVASTATIN SODIUM 40 MG/1
40 TABLET ORAL DAILY
Refills: 0 | Status: DISCONTINUED | COMMUNITY
End: 2024-06-17

## 2024-06-17 RX ORDER — LOSARTAN POTASSIUM 100 MG/1
100 TABLET, FILM COATED ORAL DAILY
Refills: 0 | Status: ACTIVE | COMMUNITY

## 2024-06-17 RX ORDER — CHOLECALCIFEROL (VITAMIN D3) 125 MCG
TABLET ORAL
Refills: 0 | Status: DISCONTINUED | COMMUNITY
End: 2024-06-17

## 2024-06-17 RX ORDER — LEVOTHYROXINE SODIUM 0.11 MG/1
112 TABLET ORAL DAILY
Refills: 0 | Status: ACTIVE | COMMUNITY

## 2024-06-17 RX ORDER — PHENTERMINE AND TOPIRAMATE 3.75; 23 MG/1; MG/1
3.75-23 CAPSULE, EXTENDED RELEASE ORAL DAILY
Qty: 14 | Refills: 0 | Status: DISCONTINUED | COMMUNITY
Start: 2020-02-25 | End: 2024-06-17

## 2024-06-17 NOTE — HISTORY OF PRESENT ILLNESS
[FreeTextEntry1] : 69 yo female presents for cardiac evaluation. Pt has a history of HLD, HTN and glucose intolerance. She has ABHISHEK but is not using a CPAP. Pt is obese. No tobacco, no ETOH. Difficult to exercise because of right knee pain. 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 She regularly walks two flights of stairs without difficulty. Medications were reviewed.

## 2024-06-17 NOTE — DISCUSSION/SUMMARY
[Bundle Branch Block] : ~T bundle branch block [Hyperlipidemia] : hyperlipidemia [Stable] : stable [Paroxysmal SVT] : paroxysmal supraventricular tachycardia [Patient] : the patient [FreeTextEntry4] : ABHISHEK [FreeTextEntry1] : Pt will have an Echo. Will assess the need for a monitor next visit. D/C Pravachol, start rosuvastatin 10 mg. PMD referral. Ortho referral. Consider GLP2. Follow up in 2 months for possible up titration of BP meds.  [EKG obtained to assist in diagnosis and management of assessed problem(s)] : EKG obtained to assist in diagnosis and management of assessed problem(s)

## 2024-06-26 ENCOUNTER — NON-APPOINTMENT (OUTPATIENT)
Age: 69
End: 2024-06-26

## 2024-07-01 ENCOUNTER — OFFICE (OUTPATIENT)
Dept: URBAN - METROPOLITAN AREA CLINIC 12 | Facility: CLINIC | Age: 69
Setting detail: OPHTHALMOLOGY
End: 2024-07-01
Payer: COMMERCIAL

## 2024-07-01 DIAGNOSIS — H25.13: ICD-10-CM

## 2024-07-01 DIAGNOSIS — H40.033: ICD-10-CM

## 2024-07-01 PROCEDURE — 92250 FUNDUS PHOTOGRAPHY W/I&R: CPT | Performed by: STUDENT IN AN ORGANIZED HEALTH CARE EDUCATION/TRAINING PROGRAM

## 2024-07-01 PROCEDURE — 92014 COMPRE OPH EXAM EST PT 1/>: CPT | Performed by: STUDENT IN AN ORGANIZED HEALTH CARE EDUCATION/TRAINING PROGRAM

## 2024-07-01 ASSESSMENT — CONFRONTATIONAL VISUAL FIELD TEST (CVF)
OD_FINDINGS: FULL
OS_FINDINGS: FULL

## 2024-07-02 ENCOUNTER — APPOINTMENT (OUTPATIENT)
Dept: ORTHOPEDIC SURGERY | Facility: CLINIC | Age: 69
End: 2024-07-02
Payer: MEDICARE

## 2024-07-02 DIAGNOSIS — M75.31 CALCIFIC TENDINITIS OF RIGHT SHOULDER: ICD-10-CM

## 2024-07-02 PROCEDURE — 73030 X-RAY EXAM OF SHOULDER: CPT | Mod: RT

## 2024-07-02 PROCEDURE — 99204 OFFICE O/P NEW MOD 45 MIN: CPT

## 2024-07-03 ENCOUNTER — NON-APPOINTMENT (OUTPATIENT)
Age: 69
End: 2024-07-03

## 2024-07-03 ENCOUNTER — APPOINTMENT (OUTPATIENT)
Dept: DERMATOLOGY | Facility: CLINIC | Age: 69
End: 2024-07-03
Payer: MEDICARE

## 2024-07-03 DIAGNOSIS — L82.1 OTHER SEBORRHEIC KERATOSIS: ICD-10-CM

## 2024-07-03 DIAGNOSIS — D22.5 MELANOCYTIC NEVI OF TRUNK: ICD-10-CM

## 2024-07-03 PROCEDURE — 99202 OFFICE O/P NEW SF 15 MIN: CPT

## 2024-07-03 RX ORDER — PRAVASTATIN SODIUM 40 MG/1
40 TABLET ORAL DAILY
Refills: 0 | Status: ACTIVE | COMMUNITY

## 2024-07-03 RX ORDER — HYDROCHLOROTHIAZIDE 12.5 MG/1
12.5 TABLET ORAL DAILY
Refills: 0 | Status: ACTIVE | COMMUNITY

## 2024-07-08 ENCOUNTER — APPOINTMENT (OUTPATIENT)
Dept: CARDIOLOGY | Facility: CLINIC | Age: 69
End: 2024-07-08
Payer: MEDICARE

## 2024-07-08 PROCEDURE — 93306 TTE W/DOPPLER COMPLETE: CPT

## 2024-07-20 ENCOUNTER — NON-APPOINTMENT (OUTPATIENT)
Age: 69
End: 2024-07-20

## 2024-08-05 ENCOUNTER — APPOINTMENT (OUTPATIENT)
Dept: INTERNAL MEDICINE | Facility: CLINIC | Age: 69
End: 2024-08-05

## 2024-08-05 PROBLEM — E66.01 MORBID OBESITY: Status: ACTIVE | Noted: 2024-08-05

## 2024-08-05 PROBLEM — Z23 ENCOUNTER FOR IMMUNIZATION: Status: ACTIVE | Noted: 2024-08-05 | Resolved: 2024-08-19

## 2024-08-05 PROBLEM — Z76.89 ENCOUNTER TO ESTABLISH CARE: Status: ACTIVE | Noted: 2024-08-05

## 2024-08-05 PROCEDURE — G0009: CPT

## 2024-08-05 PROCEDURE — G0444 DEPRESSION SCREEN ANNUAL: CPT | Mod: 59

## 2024-08-05 PROCEDURE — 99203 OFFICE O/P NEW LOW 30 MIN: CPT | Mod: 25

## 2024-08-05 PROCEDURE — 90677 PCV20 VACCINE IM: CPT

## 2024-08-05 PROCEDURE — G0447 BEHAVIOR COUNSEL OBESITY 15M: CPT | Mod: 59

## 2024-08-05 NOTE — ASSESSMENT
[FreeTextEntry1] : Patient presented today to establish care HM: labs were done in 06/24 , i reviewed and discussed with pt. Declined for HIV and Hep C  screening test.   she is up to date on mammo and PAP : result  normal. up to date with colonoscopy , result  normal EKG: was done on 06/24 DEXA:02/24 Skin CA screening: Avoid excess sun exposure. To use sunscreen.   Received   flu vaccine received pcv 20 today. Declined  tdap,   Received  COVID vaccine   alcohol screening :SIBRT:0 Depression screening:PHQ2:0  Hypertension Blood pressure is at goal today. She is currently on carvedilol 25 mg 1 tablet twice a day, losartan 100 mg once a day and hydrochlorothiazide 12.5 mg once a day.  She will continue with current medication. Advised low-salt diet and exercise.  Hypothyroidism She is currently euthyroid on levothyroxine 112 mcg.  Hyperlipidemia Seen cardiology. She recently had echocardiogram patient wanted me to review the results and discussed with her. She is currently on pravastatin 40 mg once a day.  obesity: BMI:38 Advised low carb , Mediterranean diet, avoid carbonated beverage, added sugar and sweets. exercise min 150 min/wk. portion control, maintain a food diary. drink plenty of water.   f/u in December.

## 2024-08-05 NOTE — HEALTH RISK ASSESSMENT
[Very Good] : ~his/her~  mood as very good [No] : No [Little interest or pleasure doing things] : 1) Little interest or pleasure doing things [Feeling down, depressed, or hopeless] : 2) Feeling down, depressed, or hopeless [0] : 2) Feeling down, depressed, or hopeless: Not at all (0) [PHQ-2 Negative - No further assessment needed] : PHQ-2 Negative - No further assessment needed [Patient reported mammogram was normal] : Patient reported mammogram was normal [HIV test declined] : HIV test declined [Hepatitis C test declined] : Hepatitis C test declined [] :  [Smoke Detector] : smoke detector [Seat Belt] :  uses seat belt [Never] : Never [NO] : No [Audit-CScore] : 0 [[id number]] : 0 [Reports changes in hearing] : Reports no changes in hearing [Reports changes in vision] : Reports no changes in vision [Reports changes in dental health] : Reports no changes in dental health [MammogramDate] : 01/24 [BoneDensityDate] : 02/24

## 2024-08-05 NOTE — HISTORY OF PRESENT ILLNESS
[FreeTextEntry1] : Establish care. [de-identified] : Ms. BLANQUITA QUINONES is a 68 year female She presents today to establish care. Medical history of hypertension currently on carvedilol and hydrochlorothiazide, losartan hypothyroidism, hyperlipidemia, morbid obesity, arthritis right knee. 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 Her sleep cycle is disturbed she watched TV all night and she gets hungry and eats lots of late-night snacks She is overall doing well. She recently had labs done in June ordered by her last provider

## 2024-08-07 ENCOUNTER — APPOINTMENT (OUTPATIENT)
Dept: GASTROENTEROLOGY | Facility: CLINIC | Age: 69
End: 2024-08-07

## 2024-08-07 PROBLEM — R13.10 DYSPHAGIA: Status: ACTIVE | Noted: 2024-08-07

## 2024-08-07 PROBLEM — K22.2 SCHATZKI'S RING: Status: ACTIVE | Noted: 2024-08-07

## 2024-08-07 PROBLEM — Z71.9 ENCOUNTER FOR CONSULTATION: Status: ACTIVE | Noted: 2024-08-07

## 2024-08-07 PROBLEM — K57.90 DIVERTICULOSIS: Status: ACTIVE | Noted: 2024-08-07

## 2024-08-07 PROBLEM — K44.9 HIATAL HERNIA: Status: ACTIVE | Noted: 2024-08-07

## 2024-08-07 PROCEDURE — 99204 OFFICE O/P NEW MOD 45 MIN: CPT

## 2024-08-07 NOTE — PHYSICAL EXAM
[Hearing Threshold Finger Rub Not [place]] : hearing was normal [Normal Appearance] : the appearance of the neck was normal [Normal] : heart rate was normal and rhythm regular, normal S1 and S2, no murmurs [None] : no edema [Bowel Sounds] : normal bowel sounds [Abdomen Tenderness] : non-tender [No Masses] : no abdominal mass palpated [Abdomen Soft] : soft [Oriented To Time, Place, And Person] : oriented to person, place, and time

## 2024-08-07 NOTE — PHYSICAL EXAM
[Hearing Threshold Finger Rub Not [place]] : hearing was normal [Normal Appearance] : the appearance of the neck was normal [Normal] : heart rate was normal and rhythm regular, normal S1 and S2, no murmurs [None] : no edema [Bowel Sounds] : normal bowel sounds [Abdomen Tenderness] : non-tender [No Masses] : no abdominal mass palpated [Abdomen Soft] : soft [Oriented To Time, Place, And Person] : oriented to person, place, and time

## 2024-08-08 NOTE — HISTORY OF PRESENT ILLNESS
[de-identified] : 7/7/21, Dr. Murray, for atypical chest pain: esophagitis, gastritis, esophageal ring, fundic gland polyp, hiatal hernia  7/29/19, Dr. Murray, for atypical chest pain: large sliding hiatal hernia, esophageal ring, gastritis [FreeTextEntry1] : 7/29/19, Dr. Murray, for occult blood: moderate diverticulosis of sigmoid colon, Grade I internal hemorrhoids. Recommend repeat colonoscopy in 5 years.

## 2024-08-08 NOTE — ASSESSMENT
[FreeTextEntry1] : Pleasant 68-year-old woman with a Hx of HTN, HLD, hypothyroidism, cholelithiasis, GERD (takes Omeprazole prn), morbid obesity, anemia, and arthritis (R knee and R shoulder; in PT) presents for a screening colonoscopy consult. Reports occasional heartburn and occasional dysphagia. Recommend that the pt start daily Pepcid for GERD (take as directed on the bottle), avoid NSAIDs (take Tylenol instead), and schedule an EGD and colonoscopy.  The patient will proceed with an EGD and screening colonoscopy. I explained to the patient the risks, alternatives and benefits to an EGD and screening colonoscopy. Risk including but not limited to bleeding, perforation, infection adverse medication reaction. Questions were answered. agreeable to proceed with the planned procedures.   The patient will hold NSAIDs and vitamins/supplements for 5 days prior. Otherwise continue medications as prescribed. The patient is not on diabetes medications or anticoagulation.   Patient seen and examined, overseeing documentation by Laly WELCH Will proceed with an EGD and screening colonoscopy. Medications as usual. Reviewed and reconciled medications, allergies, PMHx, PSHx, SocHx, FMHx. Reviewed imaging, blood work, diagnostic testing, discussed with patient. Further recommendations pending results of above work-up and evaluation.  All questions answered Call with any questions or concerns Time spent before and after visit reviewing patient's chart

## 2024-08-08 NOTE — HISTORY OF PRESENT ILLNESS
[de-identified] : 7/7/21, Dr. Murray, for atypical chest pain: esophagitis, gastritis, esophageal ring, fundic gland polyp, hiatal hernia  7/29/19, Dr. Murray, for atypical chest pain: large sliding hiatal hernia, esophageal ring, gastritis [FreeTextEntry1] : 7/29/19, Dr. Murray, for occult blood: moderate diverticulosis of sigmoid colon, Grade I internal hemorrhoids. Recommend repeat colonoscopy in 5 years.

## 2024-08-08 NOTE — CONSULT LETTER
[Dear  ___] : Dear  [unfilled], [Consult Letter:] : I had the pleasure of evaluating your patient, [unfilled]. [Please see my note below.] : Please see my note below. [Consult Closing:] : Thank you very much for allowing me to participate in the care of this patient.  If you have any questions, please do not hesitate to contact me. [Sincerely,] : Sincerely, [FreeTextEntry3] : Dr. Mary Falk

## 2024-08-21 ENCOUNTER — TRANSCRIPTION ENCOUNTER (OUTPATIENT)
Age: 69
End: 2024-08-21

## 2024-08-22 ENCOUNTER — NON-APPOINTMENT (OUTPATIENT)
Age: 69
End: 2024-08-22

## 2024-08-22 ENCOUNTER — APPOINTMENT (OUTPATIENT)
Dept: CARDIOLOGY | Facility: CLINIC | Age: 69
End: 2024-08-22
Payer: MEDICARE

## 2024-08-22 VITALS
HEART RATE: 62 BPM | SYSTOLIC BLOOD PRESSURE: 100 MMHG | WEIGHT: 215 LBS | OXYGEN SATURATION: 100 % | BODY MASS INDEX: 39.56 KG/M2 | HEIGHT: 62 IN | DIASTOLIC BLOOD PRESSURE: 66 MMHG

## 2024-08-22 DIAGNOSIS — R07.9 CHEST PAIN, UNSPECIFIED: ICD-10-CM

## 2024-08-22 DIAGNOSIS — I10 ESSENTIAL (PRIMARY) HYPERTENSION: ICD-10-CM

## 2024-08-22 DIAGNOSIS — E78.5 HYPERLIPIDEMIA, UNSPECIFIED: ICD-10-CM

## 2024-08-22 PROCEDURE — 99214 OFFICE O/P EST MOD 30 MIN: CPT

## 2024-08-22 PROCEDURE — 93000 ELECTROCARDIOGRAM COMPLETE: CPT

## 2024-08-22 PROCEDURE — G2211 COMPLEX E/M VISIT ADD ON: CPT

## 2024-08-22 RX ORDER — ROSUVASTATIN CALCIUM 20 MG/1
20 TABLET, FILM COATED ORAL
Qty: 90 | Refills: 3 | Status: ACTIVE | COMMUNITY
Start: 2024-08-22 | End: 1900-01-01

## 2024-08-22 NOTE — HISTORY OF PRESENT ILLNESS
[FreeTextEntry1] : 67 yo female presents for cardiac evaluation. Pt has a history of HLD, HTN and glucose intolerance. She has ABHISHEK but is not using a CPAP. Pt is obese. No tobacco, no ETOH. Difficult to exercise because of right knee pain. 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 She regularly walks two flights of stairs without difficulty. Medications were reviewed.  Pt returns for testing evaluation. PMD notes and GI notes were reviewed. Pt  denies chest pain or shortness of breath. She feels lightheaded at times. BP is borderline low.

## 2024-08-22 NOTE — DISCUSSION/SUMMARY
[Bundle Branch Block] : ~T bundle branch block [Hyperlipidemia] : hyperlipidemia [Stable] : stable [Paroxysmal SVT] : paroxysmal supraventricular tachycardia [Patient] : the patient [FreeTextEntry4] : ABHISHEK [FreeTextEntry1] : Echo was reviewed. Pt will reduce losartan to 50 mg daily, She will discontinue Pravachol and start Rosuvastatin. She will follow up in 6 months. Weight loss programs were discussed. Suggested NOOM [EKG obtained to assist in diagnosis and management of assessed problem(s)] : EKG obtained to assist in diagnosis and management of assessed problem(s)

## 2024-08-30 ENCOUNTER — APPOINTMENT (OUTPATIENT)
Dept: INTERNAL MEDICINE | Facility: CLINIC | Age: 69
End: 2024-08-30

## 2024-08-30 VITALS
BODY MASS INDEX: 38.64 KG/M2 | HEART RATE: 65 BPM | DIASTOLIC BLOOD PRESSURE: 66 MMHG | RESPIRATION RATE: 16 BRPM | HEIGHT: 62 IN | TEMPERATURE: 98.2 F | SYSTOLIC BLOOD PRESSURE: 114 MMHG | OXYGEN SATURATION: 99 % | WEIGHT: 210 LBS

## 2024-08-30 DIAGNOSIS — J22 UNSPECIFIED ACUTE LOWER RESPIRATORY INFECTION: ICD-10-CM

## 2024-08-30 PROCEDURE — 99203 OFFICE O/P NEW LOW 30 MIN: CPT

## 2024-08-30 RX ORDER — DOXYCYCLINE 100 MG/1
100 TABLET, FILM COATED ORAL
Qty: 14 | Refills: 0 | Status: ACTIVE | COMMUNITY
Start: 2024-08-30 | End: 1900-01-01

## 2024-08-30 RX ORDER — METHYLPREDNISOLONE 4 MG/1
4 TABLET ORAL
Qty: 1 | Refills: 0 | Status: ACTIVE | COMMUNITY
Start: 2024-08-30 | End: 1900-01-01

## 2024-09-05 NOTE — HISTORY OF PRESENT ILLNESS
[FreeTextEntry8] : cough / coarse breathe sounds. bronchitis with possible lung infection discussed CXR pt declines. Start Doxy and medrol already tested covid negative at home

## 2024-09-05 NOTE — PHYSICAL EXAM
[No Acute Distress] : no acute distress [Well Nourished] : well nourished [Well Developed] : well developed [Well-Appearing] : well-appearing [Normal Sclera/Conjunctiva] : normal sclera/conjunctiva [PERRL] : pupils equal round and reactive to light [EOMI] : extraocular movements intact [Normal Outer Ear/Nose] : the outer ears and nose were normal in appearance [Normal Oropharynx] : the oropharynx was normal [No JVD] : no jugular venous distention [No Lymphadenopathy] : no lymphadenopathy [Supple] : supple [Thyroid Normal, No Nodules] : the thyroid was normal and there were no nodules present [No Respiratory Distress] : no respiratory distress  [No Accessory Muscle Use] : no accessory muscle use [Normal Rate] : normal rate  [Regular Rhythm] : with a regular rhythm [Normal S1, S2] : normal S1 and S2 [No Murmur] : no murmur heard [No Carotid Bruits] : no carotid bruits [No Abdominal Bruit] : a ~M bruit was not heard ~T in the abdomen [No Varicosities] : no varicosities [Pedal Pulses Present] : the pedal pulses are present [No Edema] : there was no peripheral edema [No Palpable Aorta] : no palpable aorta [No Extremity Clubbing/Cyanosis] : no extremity clubbing/cyanosis [Soft] : abdomen soft [Non Tender] : non-tender [Non-distended] : non-distended [No Masses] : no abdominal mass palpated [No HSM] : no HSM [Normal Bowel Sounds] : normal bowel sounds [Normal Posterior Cervical Nodes] : no posterior cervical lymphadenopathy [Normal Anterior Cervical Nodes] : no anterior cervical lymphadenopathy [No CVA Tenderness] : no CVA  tenderness [No Spinal Tenderness] : no spinal tenderness [No Joint Swelling] : no joint swelling [Grossly Normal Strength/Tone] : grossly normal strength/tone [No Rash] : no rash [Coordination Grossly Intact] : coordination grossly intact [No Focal Deficits] : no focal deficits [Normal Gait] : normal gait [Deep Tendon Reflexes (DTR)] : deep tendon reflexes were 2+ and symmetric [Normal Affect] : the affect was normal [Normal Insight/Judgement] : insight and judgment were intact [de-identified] : coarse breathe sounds / wheeze B/L

## 2024-09-05 NOTE — PHYSICAL EXAM
[No Acute Distress] : no acute distress [Well Nourished] : well nourished [Well Developed] : well developed [Well-Appearing] : well-appearing [Normal Sclera/Conjunctiva] : normal sclera/conjunctiva [PERRL] : pupils equal round and reactive to light [EOMI] : extraocular movements intact [Normal Outer Ear/Nose] : the outer ears and nose were normal in appearance [Normal Oropharynx] : the oropharynx was normal [No JVD] : no jugular venous distention [No Lymphadenopathy] : no lymphadenopathy [Supple] : supple [Thyroid Normal, No Nodules] : the thyroid was normal and there were no nodules present [No Respiratory Distress] : no respiratory distress  [No Accessory Muscle Use] : no accessory muscle use [Normal Rate] : normal rate  [Regular Rhythm] : with a regular rhythm [Normal S1, S2] : normal S1 and S2 [No Murmur] : no murmur heard [No Carotid Bruits] : no carotid bruits [No Abdominal Bruit] : a ~M bruit was not heard ~T in the abdomen [No Varicosities] : no varicosities [Pedal Pulses Present] : the pedal pulses are present [No Edema] : there was no peripheral edema [No Palpable Aorta] : no palpable aorta [No Extremity Clubbing/Cyanosis] : no extremity clubbing/cyanosis [Soft] : abdomen soft [Non Tender] : non-tender [Non-distended] : non-distended [No Masses] : no abdominal mass palpated [No HSM] : no HSM [Normal Bowel Sounds] : normal bowel sounds [Normal Posterior Cervical Nodes] : no posterior cervical lymphadenopathy [Normal Anterior Cervical Nodes] : no anterior cervical lymphadenopathy [No CVA Tenderness] : no CVA  tenderness [No Spinal Tenderness] : no spinal tenderness [No Joint Swelling] : no joint swelling [Grossly Normal Strength/Tone] : grossly normal strength/tone [No Rash] : no rash [Coordination Grossly Intact] : coordination grossly intact [No Focal Deficits] : no focal deficits [Normal Gait] : normal gait [Deep Tendon Reflexes (DTR)] : deep tendon reflexes were 2+ and symmetric [Normal Affect] : the affect was normal [Normal Insight/Judgement] : insight and judgment were intact [de-identified] : coarse breathe sounds / wheeze B/L

## 2024-09-05 NOTE — ASSESSMENT
[FreeTextEntry1] : , , Panda Martinez advised CXR.  pt declined.  explained risk vs benefits Already had negative covid test at home per patient.

## 2024-10-04 ENCOUNTER — APPOINTMENT (OUTPATIENT)
Dept: GASTROENTEROLOGY | Facility: AMBULATORY MEDICAL SERVICES | Age: 69
End: 2024-10-04
Payer: MEDICARE

## 2024-10-04 ENCOUNTER — RESULT REVIEW (OUTPATIENT)
Age: 69
End: 2024-10-04

## 2024-10-04 PROCEDURE — 45378 DIAGNOSTIC COLONOSCOPY: CPT

## 2024-10-04 PROCEDURE — 43239 EGD BIOPSY SINGLE/MULTIPLE: CPT

## 2024-11-12 PROBLEM — H52.4 PRESBYOPIA ; BOTH EYES: Status: ACTIVE | Noted: 2024-11-12

## 2024-12-24 ENCOUNTER — APPOINTMENT (OUTPATIENT)
Dept: INTERNAL MEDICINE | Facility: CLINIC | Age: 69
End: 2024-12-24

## 2024-12-24 VITALS
HEART RATE: 57 BPM | RESPIRATION RATE: 16 BRPM | WEIGHT: 217 LBS | DIASTOLIC BLOOD PRESSURE: 82 MMHG | HEIGHT: 62 IN | OXYGEN SATURATION: 99 % | BODY MASS INDEX: 39.93 KG/M2 | SYSTOLIC BLOOD PRESSURE: 140 MMHG | TEMPERATURE: 97.8 F

## 2024-12-24 DIAGNOSIS — E03.9 HYPOTHYROIDISM, UNSPECIFIED: ICD-10-CM

## 2024-12-24 DIAGNOSIS — I10 ESSENTIAL (PRIMARY) HYPERTENSION: ICD-10-CM

## 2024-12-24 DIAGNOSIS — Z23 ENCOUNTER FOR IMMUNIZATION: ICD-10-CM

## 2024-12-24 PROCEDURE — 36415 COLL VENOUS BLD VENIPUNCTURE: CPT

## 2024-12-24 PROCEDURE — 90471 IMMUNIZATION ADMIN: CPT

## 2024-12-24 PROCEDURE — 90715 TDAP VACCINE 7 YRS/> IM: CPT

## 2024-12-24 PROCEDURE — 99214 OFFICE O/P EST MOD 30 MIN: CPT | Mod: 25

## 2025-01-05 LAB
ALBUMIN SERPL ELPH-MCNC: 4.3 G/DL
ALP BLD-CCNC: 74 U/L
ALT SERPL-CCNC: 17 U/L
ANION GAP SERPL CALC-SCNC: 12 MMOL/L
AST SERPL-CCNC: 16 U/L
BILIRUB SERPL-MCNC: 0.5 MG/DL
BUN SERPL-MCNC: 34 MG/DL
CALCIUM SERPL-MCNC: 9.9 MG/DL
CHLORIDE SERPL-SCNC: 102 MMOL/L
CO2 SERPL-SCNC: 26 MMOL/L
CREAT SERPL-MCNC: 1.06 MG/DL
EGFR: 57 ML/MIN/1.73M2
GLUCOSE SERPL-MCNC: 98 MG/DL
POTASSIUM SERPL-SCNC: 4.6 MMOL/L
PROT SERPL-MCNC: 6.7 G/DL
SODIUM SERPL-SCNC: 140 MMOL/L
T3 SERPL-MCNC: 112 NG/DL
T4 FREE SERPL-MCNC: 1.8 NG/DL
THYROGLOB AB SERPL-ACNC: 200 IU/ML
THYROPEROXIDASE AB SERPL IA-ACNC: 10.6 IU/ML
TSH SERPL-ACNC: 0.28 UIU/ML

## 2025-01-06 ENCOUNTER — RX ONLY (RX ONLY)
Age: 70
End: 2025-01-06

## 2025-01-06 ENCOUNTER — OFFICE (OUTPATIENT)
Dept: URBAN - METROPOLITAN AREA CLINIC 12 | Facility: CLINIC | Age: 70
Setting detail: OPHTHALMOLOGY
End: 2025-01-06
Payer: MEDICARE

## 2025-01-06 DIAGNOSIS — H40.033: ICD-10-CM

## 2025-01-06 PROCEDURE — 92133 CPTRZD OPH DX IMG PST SGM ON: CPT | Performed by: STUDENT IN AN ORGANIZED HEALTH CARE EDUCATION/TRAINING PROGRAM

## 2025-01-06 PROCEDURE — 92083 EXTENDED VISUAL FIELD XM: CPT | Performed by: STUDENT IN AN ORGANIZED HEALTH CARE EDUCATION/TRAINING PROGRAM

## 2025-01-06 ASSESSMENT — SUPERFICIAL PUNCTATE KERATITIS (SPK)
OS_SPK: 1+
OD_SPK: 1+

## 2025-01-06 ASSESSMENT — CONFRONTATIONAL VISUAL FIELD TEST (CVF)
OS_FINDINGS: FULL
OD_FINDINGS: FULL

## 2025-01-08 ASSESSMENT — KERATOMETRY
OS_K2POWER_DIOPTERS: 45.25
OS_K1POWER_DIOPTERS: 43.50
OD_K2POWER_DIOPTERS: 45.25
OD_K1POWER_DIOPTERS: 44.00
OS_AXISANGLE_DEGREES: 099
OD_AXISANGLE_DEGREES: 081
METHOD_AUTO_MANUAL: AUTO

## 2025-01-08 ASSESSMENT — REFRACTION_CURRENTRX
OS_AXIS: 020
OS_VPRISM_DIRECTION: PROGS
OS_CYLINDER: -1.50
OD_VPRISM_DIRECTION: PROGS
OS_CYLINDER: -2.00
OD_SPHERE: +5.50
OS_ADD: +1.25
OD_OVR_VA: 20/
OD_CYLINDER: -1.25
OS_OVR_VA: 20/
OD_ADD: +1.25
OS_AXIS: 024
OS_OVR_VA: 20/
OD_AXIS: 023
OD_VPRISM_DIRECTION: PROGS
OS_SPHERE: +6.00
OS_ADD: +2.50
OD_AXIS: 030
OD_OVR_VA: 20/
OS_VPRISM_DIRECTION: PROGS
OD_ADD: +2.50
OS_SPHERE: +6.75
OD_CYLINDER: -2.00
OD_SPHERE: +6.75

## 2025-01-08 ASSESSMENT — REFRACTION_MANIFEST
OS_SPHERE: +6.00
OD_VA1: 20/20
OS_CYLINDER: -1.50
OD_ADD: +2.50
OS_AXIS: 015
OS_ADD: +2.50
OD_AXIS: 015
OD_SPHERE: +5.50
OD_CYLINDER: -1.25
OS_VA1: 20/20

## 2025-01-08 ASSESSMENT — VISUAL ACUITY
OS_BCVA: 20/30-1
OD_BCVA: 20/40+

## 2025-01-08 ASSESSMENT — REFRACTION_AUTOREFRACTION
OD_AXIS: 008
OD_CYLINDER: -0.25
OD_SPHERE: +4.75
OS_AXIS: 018
OS_SPHERE: +6.00
OS_CYLINDER: -1.25

## 2025-02-13 ENCOUNTER — NON-APPOINTMENT (OUTPATIENT)
Age: 70
End: 2025-02-13

## 2025-02-17 ENCOUNTER — RX ONLY (RX ONLY)
Age: 70
End: 2025-02-17

## 2025-02-17 ENCOUNTER — OFFICE (OUTPATIENT)
Dept: URBAN - METROPOLITAN AREA CLINIC 12 | Facility: CLINIC | Age: 70
Setting detail: OPHTHALMOLOGY
End: 2025-02-17
Payer: MEDICARE

## 2025-02-17 DIAGNOSIS — H10.9: ICD-10-CM

## 2025-02-17 DIAGNOSIS — H40.033: ICD-10-CM

## 2025-02-17 DIAGNOSIS — H25.13: ICD-10-CM

## 2025-02-17 PROCEDURE — 99213 OFFICE O/P EST LOW 20 MIN: CPT | Performed by: STUDENT IN AN ORGANIZED HEALTH CARE EDUCATION/TRAINING PROGRAM

## 2025-02-17 ASSESSMENT — CONFRONTATIONAL VISUAL FIELD TEST (CVF)
OD_FINDINGS: FULL
OS_FINDINGS: FULL

## 2025-02-17 ASSESSMENT — SUPERFICIAL PUNCTATE KERATITIS (SPK)
OD_SPK: 1+
OS_SPK: 1+

## 2025-02-20 ASSESSMENT — REFRACTION_AUTOREFRACTION
OS_CYLINDER: -2.50
OS_SPHERE: +5.75
OD_SPHERE: +4.50
OS_AXIS: 004
OD_CYLINDER: -1.25
OD_AXIS: 004

## 2025-02-20 ASSESSMENT — REFRACTION_MANIFEST
OD_ADD: +2.50
OS_SPHERE: +6.00
OS_CYLINDER: -1.50
OS_ADD: +2.50
OD_VA1: 20/20
OS_VA1: 20/20
OS_AXIS: 015
OD_SPHERE: +5.50
OD_AXIS: 015
OD_CYLINDER: -1.25

## 2025-02-20 ASSESSMENT — REFRACTION_CURRENTRX
OS_VPRISM_DIRECTION: PROGS
OD_VPRISM_DIRECTION: PROGS
OD_ADD: +1.25
OS_ADD: +2.50
OD_OVR_VA: 20/
OD_SPHERE: +6.75
OS_OVR_VA: 20/
OS_SPHERE: +6.00
OS_OVR_VA: 20/
OS_ADD: +1.25
OD_CYLINDER: -1.25
OD_VPRISM_DIRECTION: PROGS
OD_CYLINDER: -2.00
OD_AXIS: 030
OD_AXIS: 023
OS_AXIS: 024
OD_SPHERE: +5.50
OS_SPHERE: +6.75
OD_ADD: +2.50
OS_CYLINDER: -2.00
OD_OVR_VA: 20/
OS_CYLINDER: -1.50
OS_AXIS: 020
OS_VPRISM_DIRECTION: PROGS

## 2025-02-20 ASSESSMENT — VISUAL ACUITY
OS_BCVA: 20/30-1
OD_BCVA: 20/40-1

## 2025-02-20 ASSESSMENT — KERATOMETRY
OD_K2POWER_DIOPTERS: 46.00
OD_AXISANGLE_DEGREES: 093
OS_AXISANGLE_DEGREES: 100
METHOD_AUTO_MANUAL: AUTO
OS_K1POWER_DIOPTERS: 43.50
OS_K2POWER_DIOPTERS: 46.50
OD_K1POWER_DIOPTERS: 44.00

## 2025-02-25 ENCOUNTER — APPOINTMENT (OUTPATIENT)
Dept: OBGYN | Facility: CLINIC | Age: 70
End: 2025-02-25
Payer: MEDICARE

## 2025-02-25 VITALS — BODY MASS INDEX: 38.76 KG/M2 | HEIGHT: 62.5 IN | WEIGHT: 216 LBS

## 2025-02-25 VITALS — SYSTOLIC BLOOD PRESSURE: 131 MMHG | DIASTOLIC BLOOD PRESSURE: 77 MMHG | HEART RATE: 60 BPM

## 2025-02-25 DIAGNOSIS — Z12.11 ENCOUNTER FOR SCREENING FOR MALIGNANT NEOPLASM OF COLON: ICD-10-CM

## 2025-02-25 DIAGNOSIS — Z01.419 ENCOUNTER FOR GYNECOLOGICAL EXAMINATION (GENERAL) (ROUTINE) W/OUT ABNORMAL FINDINGS: ICD-10-CM

## 2025-02-25 DIAGNOSIS — Z12.39 ENCOUNTER FOR OTHER SCREENING FOR MALIGNANT NEOPLASM OF BREAST: ICD-10-CM

## 2025-02-25 DIAGNOSIS — R92.30 DENSE BREASTS, UNSPECIFIED: ICD-10-CM

## 2025-02-25 LAB
BILIRUB UR QL STRIP: NEGATIVE
CLARITY UR: CLEAR
COLLECTION METHOD: NORMAL
DATE COLLECTED: NORMAL
GLUCOSE UR-MCNC: NEGATIVE
HCG UR QL: 1 EU/DL
HEMOCCULT SP1 STL QL: NEGATIVE
HEMOGLOBIN: 11.5
HGB UR QL STRIP.AUTO: NORMAL
KETONES UR-MCNC: NEGATIVE
LEUKOCYTE ESTERASE UR QL STRIP: NEGATIVE
NITRITE UR QL STRIP: NEGATIVE
PH UR STRIP: 6
PROT UR STRIP-MCNC: NEGATIVE
QUALITY CONTROL: YES
SP GR UR STRIP: 1

## 2025-02-25 PROCEDURE — G0101: CPT

## 2025-02-25 PROCEDURE — 85018 HEMOGLOBIN: CPT | Mod: QW

## 2025-02-25 PROCEDURE — 82270 OCCULT BLOOD FECES: CPT

## 2025-02-25 PROCEDURE — 81003 URINALYSIS AUTO W/O SCOPE: CPT | Mod: QW

## 2025-02-26 ENCOUNTER — OFFICE (OUTPATIENT)
Dept: URBAN - METROPOLITAN AREA CLINIC 12 | Facility: CLINIC | Age: 70
Setting detail: OPHTHALMOLOGY
End: 2025-02-26
Payer: MEDICARE

## 2025-02-26 DIAGNOSIS — H10.9: ICD-10-CM

## 2025-02-26 DIAGNOSIS — H25.13: ICD-10-CM

## 2025-02-26 DIAGNOSIS — H40.89: ICD-10-CM

## 2025-02-26 PROCEDURE — 99214 OFFICE O/P EST MOD 30 MIN: CPT | Performed by: STUDENT IN AN ORGANIZED HEALTH CARE EDUCATION/TRAINING PROGRAM

## 2025-02-26 ASSESSMENT — REFRACTION_CURRENTRX
OS_VPRISM_DIRECTION: PROGS
OD_OVR_VA: 20/
OS_AXIS: 024
OD_VPRISM_DIRECTION: PROGS
OD_ADD: +1.25
OD_CYLINDER: -1.25
OS_SPHERE: +6.25
OD_VPRISM_DIRECTION: PROGS
OD_SPHERE: +5.50
OS_VPRISM_DIRECTION: PROGS
OD_OVR_VA: 20/
OS_ADD: +2.50
OS_SPHERE: +6.75
OD_CYLINDER: -2.00
OD_SPHERE: +6.75
OS_CYLINDER: -1.75
OS_ADD: +1.25
OS_OVR_VA: 20/
OD_AXIS: 12
OS_AXIS: 015
OS_CYLINDER: -2.00
OD_AXIS: 030
OS_OVR_VA: 20/
OD_ADD: +2.50

## 2025-02-26 ASSESSMENT — KERATOMETRY
OS_K2POWER_DIOPTERS: 45.75
OD_K1POWER_DIOPTERS: 44.50
OD_AXISANGLE_DEGREES: 089
OS_K1POWER_DIOPTERS: 43.50
OD_K2POWER_DIOPTERS: 45.75
OS_AXISANGLE_DEGREES: 98
METHOD_AUTO_MANUAL: AUTO

## 2025-02-26 ASSESSMENT — REFRACTION_AUTOREFRACTION
OS_CYLINDER: -1.50
OD_CYLINDER: -0.50
OD_AXIS: 179
OS_SPHERE: +5.75
OS_AXIS: 012
OD_SPHERE: +4.50

## 2025-02-26 ASSESSMENT — PACHYMETRY
OD_CT_CORRECTION: 1
OD_CT_UM: 532
OS_CT_CORRECTION: 1
OS_CT_UM: 521

## 2025-02-26 ASSESSMENT — REFRACTION_MANIFEST
OD_SPHERE: +5.50
OS_AXIS: 015
OS_VA1: 20/20
OD_CYLINDER: -1.25
OD_AXIS: 015
OS_ADD: +2.50
OS_CYLINDER: -1.50
OD_ADD: +2.50
OD_VA1: 20/20
OS_SPHERE: +6.00

## 2025-02-26 ASSESSMENT — VISUAL ACUITY
OS_BCVA: 20/40
OD_BCVA: 20/30

## 2025-02-26 ASSESSMENT — CONFRONTATIONAL VISUAL FIELD TEST (CVF)
OD_FINDINGS: FULL
OS_FINDINGS: FULL

## 2025-02-26 ASSESSMENT — SUPERFICIAL PUNCTATE KERATITIS (SPK)
OD_SPK: 1+
OS_SPK: 1+

## 2025-02-26 ASSESSMENT — TONOMETRY
OS_IOP_MMHG: 16
OD_IOP_MMHG: 16

## 2025-03-03 LAB — CYTOLOGY CVX/VAG DOC THIN PREP: NORMAL

## 2025-04-28 ENCOUNTER — RX RENEWAL (OUTPATIENT)
Age: 70
End: 2025-04-28

## 2025-04-30 ENCOUNTER — APPOINTMENT (OUTPATIENT)
Dept: INTERNAL MEDICINE | Facility: CLINIC | Age: 70
End: 2025-04-30

## 2025-04-30 VITALS
DIASTOLIC BLOOD PRESSURE: 80 MMHG | OXYGEN SATURATION: 98 % | RESPIRATION RATE: 16 BRPM | HEIGHT: 62.5 IN | TEMPERATURE: 98.1 F | WEIGHT: 217 LBS | SYSTOLIC BLOOD PRESSURE: 130 MMHG | BODY MASS INDEX: 38.93 KG/M2 | HEART RATE: 57 BPM

## 2025-04-30 DIAGNOSIS — E78.5 HYPERLIPIDEMIA, UNSPECIFIED: ICD-10-CM

## 2025-04-30 DIAGNOSIS — R42 DIZZINESS AND GIDDINESS: ICD-10-CM

## 2025-04-30 DIAGNOSIS — E03.9 HYPOTHYROIDISM, UNSPECIFIED: ICD-10-CM

## 2025-04-30 DIAGNOSIS — I10 ESSENTIAL (PRIMARY) HYPERTENSION: ICD-10-CM

## 2025-04-30 PROCEDURE — 99214 OFFICE O/P EST MOD 30 MIN: CPT

## 2025-04-30 PROCEDURE — G2211 COMPLEX E/M VISIT ADD ON: CPT

## 2025-04-30 RX ORDER — MECLIZINE HYDROCHLORIDE 25 MG/1
25 TABLET ORAL TWICE DAILY
Qty: 30 | Refills: 0 | Status: ACTIVE | COMMUNITY
Start: 2025-04-30 | End: 1900-01-01

## 2025-05-08 PROBLEM — E78.5 HLD (HYPERLIPIDEMIA): Status: ACTIVE | Noted: 2025-05-08

## 2025-06-15 ENCOUNTER — RX ONLY (RX ONLY)
Age: 70
End: 2025-06-15

## 2025-06-15 ENCOUNTER — OFFICE (OUTPATIENT)
Dept: URBAN - METROPOLITAN AREA CLINIC 12 | Facility: CLINIC | Age: 70
Setting detail: OPHTHALMOLOGY
End: 2025-06-15
Payer: MEDICARE

## 2025-06-15 DIAGNOSIS — H40.1131: ICD-10-CM

## 2025-06-15 DIAGNOSIS — H25.11: ICD-10-CM

## 2025-06-15 DIAGNOSIS — H25.13: ICD-10-CM

## 2025-06-15 PROCEDURE — 99213 OFFICE O/P EST LOW 20 MIN: CPT | Performed by: STUDENT IN AN ORGANIZED HEALTH CARE EDUCATION/TRAINING PROGRAM

## 2025-06-15 PROCEDURE — 92136 OPHTHALMIC BIOMETRY: CPT | Mod: TC | Performed by: STUDENT IN AN ORGANIZED HEALTH CARE EDUCATION/TRAINING PROGRAM

## 2025-06-15 PROCEDURE — 92136 OPHTHALMIC BIOMETRY: CPT | Mod: RT | Performed by: STUDENT IN AN ORGANIZED HEALTH CARE EDUCATION/TRAINING PROGRAM

## 2025-06-15 ASSESSMENT — REFRACTION_CURRENTRX
OS_ADD: +2.50
OS_AXIS: 024
OD_SPHERE: +6.75
OD_CYLINDER: -2.00
OD_AXIS: 12
OS_ADD: +1.25
OD_OVR_VA: 20/
OS_SPHERE: +6.25
OD_ADD: +2.50
OS_OVR_VA: 20/
OS_CYLINDER: -1.75
OS_OVR_VA: 20/
OS_AXIS: 015
OD_ADD: +1.25
OS_VPRISM_DIRECTION: PROGS
OD_VPRISM_DIRECTION: PROGS
OS_VPRISM_DIRECTION: PROGS
OS_CYLINDER: -2.00
OD_CYLINDER: -1.25
OD_AXIS: 030
OD_VPRISM_DIRECTION: PROGS
OD_OVR_VA: 20/
OS_SPHERE: +6.75
OD_SPHERE: +5.50

## 2025-06-15 ASSESSMENT — REFRACTION_MANIFEST
OD_CYLINDER: -0.50
OS_AXIS: 019
OD_AXIS: 009
OD_SPHERE: +4.75
OS_SPHERE: +5.75
OS_CYLINDER: -0.75
OS_ADD: +2.50
OD_ADD: +2.50

## 2025-06-15 ASSESSMENT — REFRACTION_AUTOREFRACTION
OD_AXIS: 009
OD_CYLINDER: -0.50
OD_SPHERE: +4.75
OS_SPHERE: +5.75
OS_CYLINDER: -0.75
OS_AXIS: 019

## 2025-06-15 ASSESSMENT — CONFRONTATIONAL VISUAL FIELD TEST (CVF)
OS_FINDINGS: FULL
OD_FINDINGS: FULL

## 2025-06-15 ASSESSMENT — TONOMETRY
OD_IOP_MMHG: 18
OS_IOP_MMHG: 16

## 2025-06-15 ASSESSMENT — KERATOMETRY
OS_AXISANGLE_DEGREES: 095
OS_K2POWER_DIOPTERS: 45.00
OD_K1POWER_DIOPTERS: 44.25
METHOD_AUTO_MANUAL: AUTO
OS_K1POWER_DIOPTERS: 43.75
OD_AXISANGLE_DEGREES: 089
OD_K2POWER_DIOPTERS: 45.00

## 2025-06-15 ASSESSMENT — VISUAL ACUITY
OS_BCVA: 20/40
OD_BCVA: 20/25+2

## 2025-06-15 ASSESSMENT — PACHYMETRY
OD_CT_CORRECTION: 1
OD_CT_UM: 532
OS_CT_CORRECTION: 1
OS_CT_UM: 521

## 2025-06-27 ENCOUNTER — OFFICE (OUTPATIENT)
Dept: URBAN - METROPOLITAN AREA CLINIC 12 | Facility: CLINIC | Age: 70
Setting detail: OPHTHALMOLOGY
End: 2025-06-27
Payer: MEDICARE

## 2025-06-27 DIAGNOSIS — H25.13: ICD-10-CM

## 2025-06-27 DIAGNOSIS — Z01.818: ICD-10-CM

## 2025-06-27 DIAGNOSIS — H40.1131: ICD-10-CM

## 2025-06-27 PROBLEM — H25.11 CATARACT SENILE NUCLEAR SCLEROSIS; RIGHT EYE, BOTH EYES: Status: ACTIVE | Noted: 2025-06-15

## 2025-06-27 PROBLEM — H40.1121 GLAUCOMA-PRIMARY OPEN ANGLE; RIGHT EYE MILD, LEFT EYE MILD: Status: ACTIVE | Noted: 2025-06-15

## 2025-06-27 PROBLEM — H40.1111 GLAUCOMA-PRIMARY OPEN ANGLE; RIGHT EYE MILD, LEFT EYE MILD: Status: ACTIVE | Noted: 2025-06-15

## 2025-06-27 PROCEDURE — 99213 OFFICE O/P EST LOW 20 MIN: CPT

## 2025-07-02 ENCOUNTER — AMBULATORY SURGERY CENTER (OUTPATIENT)
Dept: URBAN - METROPOLITAN AREA SURGERY 4 | Facility: SURGERY | Age: 70
Setting detail: OPHTHALMOLOGY
End: 2025-07-02
Payer: MEDICARE

## 2025-07-02 DIAGNOSIS — H52.221: ICD-10-CM

## 2025-07-02 DIAGNOSIS — H40.89: ICD-10-CM

## 2025-07-02 DIAGNOSIS — H25.11: ICD-10-CM

## 2025-07-02 PROCEDURE — 65820 GONIOTOMY: CPT | Mod: RT | Performed by: STUDENT IN AN ORGANIZED HEALTH CARE EDUCATION/TRAINING PROGRAM

## 2025-07-02 PROCEDURE — FEMTO PRECISION LASER CATARACT SURGERY: Mod: GY | Performed by: STUDENT IN AN ORGANIZED HEALTH CARE EDUCATION/TRAINING PROGRAM

## 2025-07-02 PROCEDURE — 66984 XCAPSL CTRC RMVL W/O ECP: CPT | Mod: RT | Performed by: STUDENT IN AN ORGANIZED HEALTH CARE EDUCATION/TRAINING PROGRAM

## 2025-07-03 ENCOUNTER — OFFICE (OUTPATIENT)
Dept: URBAN - METROPOLITAN AREA CLINIC 12 | Facility: CLINIC | Age: 70
Setting detail: OPHTHALMOLOGY
End: 2025-07-03
Payer: MEDICARE

## 2025-07-03 ENCOUNTER — RX ONLY (RX ONLY)
Age: 70
End: 2025-07-03

## 2025-07-03 DIAGNOSIS — Z96.1: ICD-10-CM

## 2025-07-03 PROCEDURE — 99024 POSTOP FOLLOW-UP VISIT: CPT | Performed by: STUDENT IN AN ORGANIZED HEALTH CARE EDUCATION/TRAINING PROGRAM

## 2025-07-03 ASSESSMENT — PACHYMETRY
OD_CT_CORRECTION: 1
OD_CT_UM: 532
OS_CT_CORRECTION: 1
OS_CT_UM: 521

## 2025-07-03 ASSESSMENT — REFRACTION_AUTOREFRACTION
OD_SPHERE: -0.75
OD_AXIS: 009
OS_AXIS: 006
OS_CYLINDER: -1.50
OD_CYLINDER: -0.25
OS_SPHERE: +5.75

## 2025-07-03 ASSESSMENT — KERATOMETRY
OD_K2POWER_DIOPTERS: 45.25
OS_K2POWER_DIOPTERS: 45.75
OD_K1POWER_DIOPTERS: 43.50
OS_AXISANGLE_DEGREES: 088
OD_AXISANGLE_DEGREES: 080
OS_K1POWER_DIOPTERS: 43.50

## 2025-07-03 ASSESSMENT — VISUAL ACUITY
OS_BCVA: 20/150
OD_BCVA: 20/80

## 2025-07-03 ASSESSMENT — CORNEAL EDEMA CLINICAL DESCRIPTION: OD_CORNEALEDEMA: 2+

## 2025-07-03 ASSESSMENT — SUPERFICIAL PUNCTATE KERATITIS (SPK)
OD_SPK: 1+
OS_SPK: 1+

## 2025-07-03 ASSESSMENT — TONOMETRY: OD_IOP_MMHG: 14

## 2025-07-03 ASSESSMENT — CONFRONTATIONAL VISUAL FIELD TEST (CVF)
OS_FINDINGS: FULL
OD_FINDINGS: FULL

## 2025-07-09 ENCOUNTER — OFFICE (OUTPATIENT)
Dept: URBAN - METROPOLITAN AREA CLINIC 12 | Facility: CLINIC | Age: 70
Setting detail: OPHTHALMOLOGY
End: 2025-07-09
Payer: MEDICARE

## 2025-07-09 DIAGNOSIS — H25.12: ICD-10-CM

## 2025-07-09 PROCEDURE — 92136 OPHTHALMIC BIOMETRY: CPT | Mod: LT | Performed by: STUDENT IN AN ORGANIZED HEALTH CARE EDUCATION/TRAINING PROGRAM

## 2025-07-09 ASSESSMENT — REFRACTION_AUTOREFRACTION
OS_CYLINDER: -1.75
OD_SPHERE: 0.00
OS_SPHERE: +5.75
OD_AXIS: 013
OD_CYLINDER: -0.75
OS_AXIS: 009

## 2025-07-09 ASSESSMENT — KERATOMETRY
OD_K1POWER_DIOPTERS: 44.50
OS_AXISANGLE_DEGREES: 095
OS_K1POWER_DIOPTERS: 43.75
OD_AXISANGLE_DEGREES: 093
OD_K2POWER_DIOPTERS: 45.75
OS_K2POWER_DIOPTERS: 45.50

## 2025-07-09 ASSESSMENT — CONFRONTATIONAL VISUAL FIELD TEST (CVF)
OD_FINDINGS: FULL
OS_FINDINGS: FULL

## 2025-07-09 ASSESSMENT — PACHYMETRY
OD_CT_UM: 532
OS_CT_CORRECTION: 1
OD_CT_CORRECTION: 1
OS_CT_UM: 521

## 2025-07-09 ASSESSMENT — SUPERFICIAL PUNCTATE KERATITIS (SPK)
OD_SPK: 1+
OS_SPK: 1+

## 2025-07-09 ASSESSMENT — VISUAL ACUITY
OD_BCVA: 20/100+
OS_BCVA: 20/30-2

## 2025-07-09 ASSESSMENT — TONOMETRY
OS_IOP_MMHG: 15
OD_IOP_MMHG: 11

## 2025-07-09 ASSESSMENT — CORNEAL EDEMA CLINICAL DESCRIPTION: OD_CORNEALEDEMA: 2+

## 2025-07-14 ENCOUNTER — RX RENEWAL (OUTPATIENT)
Age: 70
End: 2025-07-14

## 2025-07-16 ENCOUNTER — AMBULATORY SURGERY CENTER (OUTPATIENT)
Dept: URBAN - METROPOLITAN AREA SURGERY 4 | Facility: SURGERY | Age: 70
Setting detail: OPHTHALMOLOGY
End: 2025-07-16
Payer: MEDICARE

## 2025-07-16 ENCOUNTER — RX RENEWAL (OUTPATIENT)
Age: 70
End: 2025-07-16

## 2025-07-16 DIAGNOSIS — H52.222: ICD-10-CM

## 2025-07-16 DIAGNOSIS — H25.12: ICD-10-CM

## 2025-07-16 DIAGNOSIS — H40.1121: ICD-10-CM

## 2025-07-16 PROCEDURE — 66984 XCAPSL CTRC RMVL W/O ECP: CPT | Mod: 79,LT | Performed by: STUDENT IN AN ORGANIZED HEALTH CARE EDUCATION/TRAINING PROGRAM

## 2025-07-16 PROCEDURE — FEMTO PRECISION LASER CATARACT SURGERY: Mod: GY | Performed by: STUDENT IN AN ORGANIZED HEALTH CARE EDUCATION/TRAINING PROGRAM

## 2025-07-16 PROCEDURE — 65820 GONIOTOMY: CPT | Mod: 79,LT | Performed by: STUDENT IN AN ORGANIZED HEALTH CARE EDUCATION/TRAINING PROGRAM

## 2025-07-17 ENCOUNTER — OFFICE (OUTPATIENT)
Dept: URBAN - METROPOLITAN AREA CLINIC 12 | Facility: CLINIC | Age: 70
Setting detail: OPHTHALMOLOGY
End: 2025-07-17
Payer: MEDICARE

## 2025-07-17 DIAGNOSIS — Z96.1: ICD-10-CM

## 2025-07-17 PROCEDURE — 99024 POSTOP FOLLOW-UP VISIT: CPT | Performed by: STUDENT IN AN ORGANIZED HEALTH CARE EDUCATION/TRAINING PROGRAM

## 2025-07-17 ASSESSMENT — CONFRONTATIONAL VISUAL FIELD TEST (CVF)
OS_FINDINGS: FULL
OD_FINDINGS: FULL

## 2025-07-17 ASSESSMENT — KERATOMETRY
OS_AXISANGLE_DEGREES: 095
OS_K1POWER_DIOPTERS: 43.75
OD_K1POWER_DIOPTERS: 44.25
OS_K2POWER_DIOPTERS: 45.75
OD_K2POWER_DIOPTERS: 45.75
OD_AXISANGLE_DEGREES: 095

## 2025-07-17 ASSESSMENT — REFRACTION_AUTOREFRACTION
OS_AXIS: 011
OS_CYLINDER: -1.25
OD_SPHERE: PLANO
OS_SPHERE: -0.25
OD_CYLINDER: -0.75
OD_AXIS: 008

## 2025-07-17 ASSESSMENT — CORNEAL EDEMA CLINICAL DESCRIPTION: OD_CORNEALEDEMA: ABSENT

## 2025-07-17 ASSESSMENT — PACHYMETRY
OS_CT_CORRECTION: 1
OD_CT_CORRECTION: 1
OS_CT_UM: 521
OD_CT_UM: 532

## 2025-07-17 ASSESSMENT — VISUAL ACUITY
OD_BCVA: 20/25-
OS_BCVA: 20/25+3

## 2025-07-17 ASSESSMENT — SUPERFICIAL PUNCTATE KERATITIS (SPK)
OS_SPK: 1+
OD_SPK: 1+

## 2025-07-17 ASSESSMENT — TONOMETRY
OS_IOP_MMHG: 14
OD_IOP_MMHG: 16

## 2025-07-23 ENCOUNTER — OFFICE (OUTPATIENT)
Dept: URBAN - METROPOLITAN AREA CLINIC 12 | Facility: CLINIC | Age: 70
Setting detail: OPHTHALMOLOGY
End: 2025-07-23
Payer: MEDICARE

## 2025-07-23 DIAGNOSIS — Z96.1: ICD-10-CM

## 2025-07-23 PROCEDURE — 99024 POSTOP FOLLOW-UP VISIT: CPT | Performed by: STUDENT IN AN ORGANIZED HEALTH CARE EDUCATION/TRAINING PROGRAM

## 2025-07-23 ASSESSMENT — KERATOMETRY
OD_K2POWER_DIOPTERS: 45.75
OS_AXISANGLE_DEGREES: 137
OD_AXISANGLE_DEGREES: 097
OD_K1POWER_DIOPTERS: 44.25
OS_K2POWER_DIOPTERS: 45.25
OS_K1POWER_DIOPTERS: 44.50

## 2025-07-23 ASSESSMENT — REFRACTION_AUTOREFRACTION
OD_SPHERE: +0.25
OS_SPHERE: 0.00
OS_CYLINDER: -1.50
OD_AXIS: 020
OS_AXIS: 077
OD_CYLINDER: -0.75

## 2025-07-23 ASSESSMENT — PACHYMETRY
OD_CT_CORRECTION: 1
OS_CT_CORRECTION: 1
OD_CT_UM: 532
OS_CT_UM: 521

## 2025-07-23 ASSESSMENT — CORNEAL EDEMA CLINICAL DESCRIPTION: OS_CORNEALEDEMA: 1+

## 2025-07-23 ASSESSMENT — CONFRONTATIONAL VISUAL FIELD TEST (CVF)
OS_FINDINGS: FULL
OD_FINDINGS: FULL

## 2025-07-23 ASSESSMENT — TONOMETRY
OD_IOP_MMHG: 13
OS_IOP_MMHG: 18

## 2025-07-23 ASSESSMENT — SUPERFICIAL PUNCTATE KERATITIS (SPK)
OS_SPK: 1+
OD_SPK: 1+

## 2025-07-23 ASSESSMENT — VISUAL ACUITY
OS_BCVA: 20/20-2
OD_BCVA: 20/30-2

## 2025-07-28 ENCOUNTER — RX ONLY (RX ONLY)
Age: 70
End: 2025-07-28

## 2025-07-28 ENCOUNTER — OFFICE (OUTPATIENT)
Dept: URBAN - METROPOLITAN AREA CLINIC 12 | Facility: CLINIC | Age: 70
Setting detail: OPHTHALMOLOGY
End: 2025-07-28
Payer: MEDICARE

## 2025-07-28 DIAGNOSIS — Z96.1: ICD-10-CM

## 2025-07-28 PROCEDURE — 99024 POSTOP FOLLOW-UP VISIT: CPT | Performed by: STUDENT IN AN ORGANIZED HEALTH CARE EDUCATION/TRAINING PROGRAM

## 2025-07-28 ASSESSMENT — KERATOMETRY
OD_K1POWER_DIOPTERS: 44.00
OS_AXISANGLE_DEGREES: 177
OS_K2POWER_DIOPTERS: 44.25
OD_AXISANGLE_DEGREES: 095
OD_K2POWER_DIOPTERS: 45.75
OS_K1POWER_DIOPTERS: 36.25

## 2025-07-28 ASSESSMENT — SUPERFICIAL PUNCTATE KERATITIS (SPK)
OS_SPK: 1+
OD_SPK: 1+

## 2025-07-28 ASSESSMENT — REFRACTION_AUTOREFRACTION
OD_CYLINDER: -0.75
OS_AXIS: 168
OS_CYLINDER: -1.25
OS_SPHERE: -0.75
OD_SPHERE: PLANO
OD_AXIS: 009

## 2025-07-28 ASSESSMENT — CONFRONTATIONAL VISUAL FIELD TEST (CVF)
OS_FINDINGS: FULL
OD_FINDINGS: FULL

## 2025-07-28 ASSESSMENT — PACHYMETRY
OS_CT_UM: 521
OD_CT_UM: 532
OS_CT_CORRECTION: 1
OD_CT_CORRECTION: 1

## 2025-07-28 ASSESSMENT — TONOMETRY
OS_IOP_MMHG: 16
OD_IOP_MMHG: 13

## 2025-07-28 ASSESSMENT — VISUAL ACUITY
OD_BCVA: 20/60+2
OS_BCVA: 20/25

## 2025-07-28 ASSESSMENT — CORNEAL EDEMA CLINICAL DESCRIPTION: OS_CORNEALEDEMA: 1+

## 2025-07-31 LAB — HBA1C MFR BLD HPLC: 6

## 2025-08-12 ENCOUNTER — APPOINTMENT (OUTPATIENT)
Dept: INTERNAL MEDICINE | Facility: CLINIC | Age: 70
End: 2025-08-12

## 2025-08-12 VITALS
BODY MASS INDEX: 39.11 KG/M2 | HEIGHT: 62.5 IN | SYSTOLIC BLOOD PRESSURE: 141 MMHG | DIASTOLIC BLOOD PRESSURE: 63 MMHG | OXYGEN SATURATION: 97 % | WEIGHT: 218 LBS | RESPIRATION RATE: 16 BRPM | TEMPERATURE: 98 F | HEART RATE: 58 BPM

## 2025-08-12 DIAGNOSIS — I10 ESSENTIAL (PRIMARY) HYPERTENSION: ICD-10-CM

## 2025-08-12 DIAGNOSIS — Z23 ENCOUNTER FOR IMMUNIZATION: ICD-10-CM

## 2025-08-12 DIAGNOSIS — E78.5 HYPERLIPIDEMIA, UNSPECIFIED: ICD-10-CM

## 2025-08-12 DIAGNOSIS — E66.01 MORBID (SEVERE) OBESITY DUE TO EXCESS CALORIES: ICD-10-CM

## 2025-08-12 DIAGNOSIS — K21.9 GASTRO-ESOPHAGEAL REFLUX DISEASE W/OUT ESOPHAGITIS: ICD-10-CM

## 2025-08-12 DIAGNOSIS — Z00.00 ENCOUNTER FOR GENERAL ADULT MEDICAL EXAMINATION W/OUT ABNORMAL FINDINGS: ICD-10-CM

## 2025-08-12 DIAGNOSIS — E03.9 HYPOTHYROIDISM, UNSPECIFIED: ICD-10-CM

## 2025-08-12 PROCEDURE — 93000 ELECTROCARDIOGRAM COMPLETE: CPT

## 2025-08-12 PROCEDURE — G0439: CPT

## 2025-08-12 PROCEDURE — G0447 BEHAVIOR COUNSEL OBESITY 15M: CPT

## 2025-08-17 ENCOUNTER — OFFICE (OUTPATIENT)
Dept: URBAN - METROPOLITAN AREA CLINIC 12 | Facility: CLINIC | Age: 70
Setting detail: OPHTHALMOLOGY
End: 2025-08-17
Payer: MEDICARE

## 2025-08-17 DIAGNOSIS — Z96.1: ICD-10-CM

## 2025-08-17 PROCEDURE — 99024 POSTOP FOLLOW-UP VISIT: CPT | Performed by: STUDENT IN AN ORGANIZED HEALTH CARE EDUCATION/TRAINING PROGRAM

## 2025-08-17 ASSESSMENT — VISUAL ACUITY
OS_BCVA: 20/25-3
OD_BCVA: 20/25-3

## 2025-08-17 ASSESSMENT — CONFRONTATIONAL VISUAL FIELD TEST (CVF)
OD_FINDINGS: FULL
OS_FINDINGS: FULL

## 2025-08-17 ASSESSMENT — CORNEAL EDEMA CLINICAL DESCRIPTION: OS_CORNEALEDEMA: 1+

## 2025-08-17 ASSESSMENT — REFRACTION_AUTOREFRACTION
OS_AXIS: 074
OS_SPHERE: +0.25
OS_CYLINDER: -0.75
OD_CYLINDER: -1.50
OD_SPHERE: +0.75
OD_AXIS: 014

## 2025-08-17 ASSESSMENT — KERATOMETRY
OS_K1POWER_DIOPTERS: 44.50
OD_K1POWER_DIOPTERS: 44.00
OS_K2POWER_DIOPTERS: 45.50
OD_AXISANGLE_DEGREES: 109
OS_AXISANGLE_DEGREES: 109
OD_K2POWER_DIOPTERS: 45.50

## 2025-08-17 ASSESSMENT — PACHYMETRY
OD_CT_UM: 532
OS_CT_UM: 521
OD_CT_CORRECTION: 1
OS_CT_CORRECTION: 1

## 2025-08-17 ASSESSMENT — SUPERFICIAL PUNCTATE KERATITIS (SPK)
OS_SPK: 1+
OD_SPK: 1+

## 2025-09-15 ENCOUNTER — RX RENEWAL (OUTPATIENT)
Age: 70
End: 2025-09-15